# Patient Record
Sex: FEMALE | Race: WHITE | Employment: FULL TIME | URBAN - METROPOLITAN AREA
[De-identification: names, ages, dates, MRNs, and addresses within clinical notes are randomized per-mention and may not be internally consistent; named-entity substitution may affect disease eponyms.]

---

## 2017-01-05 ENCOUNTER — HOSPITAL ENCOUNTER (OUTPATIENT)
Dept: PHYSICAL THERAPY | Age: 37
Discharge: HOME | End: 2017-01-05
Payer: COMMERCIAL

## 2017-01-05 PROCEDURE — 97530 THERAPEUTIC ACTIVITIES: CPT | Performed by: PHYSICAL MEDICINE & REHABILITATION

## 2017-01-05 PROCEDURE — 97032 APPL MODALITY 1+ESTIM EA 15: CPT | Performed by: PHYSICAL MEDICINE & REHABILITATION

## 2017-01-05 NOTE — PROGRESS NOTES
PT DAILY TREATMENT NOTE - [facility] -15    Patient Name: Dilcia Torres  Date:2017  : 1980  [x]  Patient  Verified  Payor: BLUE CROSS / Plan: Noris Ivey [id number] PPO / Product Type: PPO /    In time: 9:00  Out time: 10:00  Total Treatment Time (min): 60  Visit #: 4    Treatment Area: Urinary, incontinence, stress female [N39.3]    SUBJECTIVE  Pain Level (0-10 scale):  1/10  Any medication changes, allergies to medications, adverse drug reactions, diagnosis change, or new procedure performed?: [x] No    [] Yes (see summary sheet for update)  Subjective functional status/changes:   [] No changes reported  Low back is much improved after last visit. Manual techniques and exercises were helpful. She did have a day of acute LBP following a 2 hour drive. She is doing her pelvic floor exercises regularly and feels she has increased strength and coordination, no functional bladder control improvements however.      OBJECTIVE    Modality rationale: increase muscle contraction/control to improve the patients ability to decrease urinary incontinence frequency and severity   Min Type Additional Details   30 [] Estim: []Att   []Unatt        []TENS instruct                  []IFC  []Premod   []NMES                     [x]Other:  Biofeedback []w/US   []w/ice   []w/heat  Position:  Supine and standing, multiple leg positions  Location: transvaginal    []  Traction: [] Cervical       []Lumbar                       [] Prone          []Supine                       []Intermittent   []Continuous Lbs:  [] before manual  [] after manual  []w/heat    []  Ultrasound: []Continuous   [] Pulsed at:                           []1MHz   []3MHz Location:  W/cm2:    [] Paraffin         Location:   []w/heat    []  Ice     []  Heat  []  Ice massage Position:  Location:    []  Laser  []  Other: Position:  Location:      []  Vasopneumatic Device Pressure:       [] lo [] med [] hi   Temperature:      [x] Skin assessment post-treatment:  [x]intact []redness- no adverse reaction    []redness  adverse reaction:     30 min Therapeutic Activity:  []  See flow sheet :   MET pub rami correction, L post innominate correction   Rationale: increase strength, improve coordination and increase proprioception  to improve the patients ability to decrease severity and frequency of urinary incontinence     With   [] TE   [x] TA   [] neuro   [] other: Patient Education: [x] Review HEP    [] Progressed/Changed HEP based on:   [] positioning   [] body mechanics   [] transfers   [] heat/ice application    [] other:      Other Objective/Functional Measures:  Improved endurance holds to 8 seconds, improved relaxation phase with quick drop off and no spikes. Will remeasure biofeedback next week. Pain Level (0-10 scale) post treatment: 0/10      ASSESSMENT/Changes in Function:     Patient will continue to benefit from skilled PT services to modify and progress therapeutic interventions, address functional mobility deficits, address ROM deficits, address strength deficits, analyze and address soft tissue restrictions, analyze and cue movement patterns, analyze and modify body mechanics/ergonomics and assess and modify postural abnormalities to attain remaining goals. [x]  See Plan of Care  []  See progress note/recertification  []  See Discharge Summary         Progress towards goals / Updated goals:  Able to advance exercises today with good tolerance. Pt continues to need instruction for correct exercise form and performance. Continues to demonstrate good potential to achieve functional goals stated on the plan of care.       PLAN  [x]  Upgrade activities as tolerated     []  Continue plan of care  []  Update interventions per flow sheet       []  Discharge due to:_  [x]  Other: Try squatting kegals with biofeedback next visit     Matt Isaacs, PT, MSPT   1/5/2017  9:11 AM

## 2017-01-11 ENCOUNTER — APPOINTMENT (OUTPATIENT)
Dept: PHYSICAL THERAPY | Age: 37
End: 2017-01-11
Payer: COMMERCIAL

## 2017-01-16 ENCOUNTER — HOSPITAL ENCOUNTER (OUTPATIENT)
Dept: PHYSICAL THERAPY | Age: 37
Discharge: HOME | End: 2017-01-16
Payer: COMMERCIAL

## 2017-01-16 PROCEDURE — 97032 APPL MODALITY 1+ESTIM EA 15: CPT | Performed by: PHYSICAL MEDICINE & REHABILITATION

## 2017-01-16 PROCEDURE — 97530 THERAPEUTIC ACTIVITIES: CPT | Performed by: PHYSICAL MEDICINE & REHABILITATION

## 2017-01-16 NOTE — PROGRESS NOTES
PT DAILY TREATMENT NOTE 2-15    Patient Name: Mehul Gutierrez  Date:2017  : 1980  [x]  Patient  Verified  Payor: BLUE CROSS / Plan: [facility] PPO / Product Type: PPO /    In time: 2:00  Out time: 3:00   Total Treatment Time (min): 60  Visit #: 5    Treatment Area: Urinary, incontinence, stress female [N39.3]    SUBJECTIVE  Pain Level (0-10 scale): 0/10  Any medication changes, allergies to medications, adverse drug reactions, diagnosis change, or new procedure performed?: [x] No    [] Yes (see summary sheet for update)  Subjective functional status/changes:   [] No changes reported  LBP fully resolved. She is doing her PFM exercises daily, multiple times a day.   Has been able to initiate PFM strengthening while standing, brushing teeth, etc.      OBJECTIVE    Modality rationale: decrease pain and increase tissue extensibility to improve the patients ability to decrease severity and frequency of urinary incontinence   Min Type Additional Details   30 [x] Estim: []Att   []Unatt        []TENS instruct                  []IFC  []Premod   []NMES                     [x]Other: Biofeedback []w/US   []w/ice   []w/heat  Position: Supine hook lying  Location: transvaginal    []  Traction: [] Cervical       []Lumbar                       [] Prone          []Supine                       []Intermittent   []Continuous Lbs:  [] before manual  [] after manual  []w/heat    []  Ultrasound: []Continuous   [] Pulsed at:                            []1MHz   []3MHz Location:  W/cm2:    []  Paraffin         Location:  []w/heat    []  Ice     []  Heat  []  Ice massage Position:  Location:    []  Laser  []  Other: Position:  Location:    []  Vasopneumatic Device Pressure:       [] lo [] med [] hi   Temperature:    [x] Skin assessment post-treatment:  [x]intact []redness- no adverse reaction    []redness  adverse reaction:     30 min Neuromuscular Re-education:  []  See flow sheet :   Rationale: increase strength, improve coordination and increase proprioception  to improve the patients ability to decrease severity and frequency of urinary incontinence. With   [] TE   [] TA   [x] neuro   [] other: Patient Education: [x] Review HEP    [] Progressed/Changed HEP based on:   [] positioning   [] body mechanics   [] transfers   [] heat/ice application    [] other:      Other Objective/Functional Measures:  Able to perform 10W/10R and 2W/2R in standing and while mini squatting. Pain Level (0-10 scale) post treatment: 0/10    ASSESSMENT/Changes in Function:     Patient will continue to benefit from skilled PT services to modify and progress therapeutic interventions, address functional mobility deficits, address ROM deficits, address strength deficits, analyze and address soft tissue restrictions, analyze and cue movement patterns and analyze and modify body mechanics/ergonomics to attain remaining goals. [x]  See Plan of Care  []  See progress note/recertification  []  See Discharge Summary         Progress towards goals / Updated goals:  Able to advance exercises today with good tolerance. Pt continues to need instruction for correct exercise form and performance. Continues to demonstrate good potential to achieve functional goals stated on the plan of care.       PLAN  [x]  Upgrade activities as tolerated     []  Continue plan of care  []  Update interventions per flow sheet       []  Discharge due to:_  []  Other:_      Desiree Lopes PT, MSPT 1/16/2017  2:07 PM

## 2017-01-17 ENCOUNTER — OFFICE VISIT (OUTPATIENT)
Dept: OBGYN CLINIC | Age: 37
End: 2017-01-17

## 2017-01-17 VITALS
BODY MASS INDEX: 26.66 KG/M2 | HEIGHT: 69 IN | DIASTOLIC BLOOD PRESSURE: 62 MMHG | SYSTOLIC BLOOD PRESSURE: 98 MMHG | WEIGHT: 180 LBS

## 2017-01-17 DIAGNOSIS — Z97.5 IUD (INTRAUTERINE DEVICE) IN PLACE: Primary | ICD-10-CM

## 2017-01-17 NOTE — MR AVS SNAPSHOT
Visit Information Date & Time Provider Department Dept. Phone Encounter #  
 1/17/2017  3:00 PM Sula Sandifer, MD [facility] [phone] Upcoming Health Maintenance Date Due INFLUENZA AGE 9 TO ADULT 8/1/2016 PAP AKA CERVICAL CYTOLOGY 10/31/2019 Allergies as of 1/17/2017  Review Complete On: 1/17/2017 By: Bill Corley LPN No Known Allergies Current Immunizations  Never Reviewed No immunizations on file. Not reviewed this visit Vitals BP Height(growth percentile) Weight(growth percentile) BMI OB Status Smoking Status 98/62 (BP 1 Location: Left arm, BP Patient Position: Sitting) 5' 9\" (1.753 m) 180 lb (81.6 kg) 26.58 kg/m2 IUD Never Smoker BMI and BSA Data Body Mass Index Body Surface Area  
 26.58 kg/m 2 1.99 m 2 Preferred Pharmacy Pharmacy Name Phone [id number] [place], [address] AT [facility] [phone] Your Updated Medication List  
  
   
This list is accurate as of: 1/17/17  3:36 PM.  Always use your most recent med list.  
  
  
  
  
 MIRENA 20 mcg/24 hr (5 years) IUD Generic drug:  levonorgestrel 1 Each by IntraUTERine route once. multivitamin with iron chewable tablet Commonly known as:  Garett Ramming Take 1 Tab by mouth daily. norgestimate-ethinyl estradiol 0.18/0.215/0.25 mg-25 mcg Tab Commonly known as:  ORTHO TRI-CYCLEN LO (28) Take 1 Tab by mouth daily. To-Do List   
 01/25/2017 8:30 AM  
  Appointment with Adali Schulz PT at [facility] PT [address] ([phone]) Patient Instructions Learning About Birth Control: Intrauterine Device (IUD) What is an intrauterine device (IUD)? The intrauterine device (IUD) is used to prevent pregnancy. It's a small, plastic, T-shaped device. Your doctor places the IUD in your uterus. You have a choice between a hormonal IUD and a copper IUD. The hormonal IUD prevents pregnancy by damaging or killing sperm. It also releases a type of the hormone progestin. Progestin prevents pregnancy in these ways: It thickens the mucus in the cervix. This makes it hard for sperm to travel into the uterus. It also thins the lining of the uterus, which makes it harder for a fertilized egg to attach to the uterus. Progestin can sometimes stop the ovaries from releasing an egg each month (ovulation). Hormonal IUDs prevent pregnancy for 3 to 5 years, depending on which IUD is used. Once you have it, you don't have to do anything else to prevent pregnancy. The copper IUD is wrapped in copper wire. Copper IUDs prevent pregnancy by making the uterus and fallopian tubes produce a fluid that kills sperm. The copper IUD prevents pregnancy for 10 years. Once you have it, you don't have to do anything else to prevent pregnancy. A string tied to the end of the IUD hangs down through the opening of the uterus (called the cervix) into the vagina. You can check that the IUD is in place by feeling for the string. The IUD usually stays in the uterus until your doctor removes it. How well does it work? In the first year of use: · When the hormonal IUD is used exactly as directed, fewer than 1 woman out of 100 has an unplanned pregnancy. · When the copper IUD is used exactly as directed, fewer than 1 woman out of 100 has an unplanned pregnancy. Be sure to tell your doctor about any health problems you have or medicines you take. He or she can help you choose the birth control method that is right for you. What are the advantages of an IUD? · An IUD is one of the most effective methods of birth control. · It prevents pregnancy for 3 to 10 years, depending on the type. You don't have to worry about birth control during this time. · It's safe to use while breastfeeding. · IUDs don't contain estrogen.  So you can use an IUD if you don't want to take estrogen or can't take estrogen because you have certain health problems or concerns. · An IUD is convenient. It is always providing birth control. You don't need to remember to take a pill or get a shot. You don't have to interrupt sex to protect against pregnancy. · A hormonal IUD may reduce heavy bleeding and cramping. What are the disadvantages of an IUD? · An IUD doesn't protect against sexually transmitted infections (STIs), such as herpes or HIV/AIDS. If you aren't sure if your sex partner might have an STI, use a condom to protect against disease. · A copper IUD may cause periods with more bleeding and cramping. · You have to see a doctor to have an IUD inserted and removed. · You have to check to see if the string is in place. Where can you learn more? Go to http://stella-saar.info/. Enter X977 in the search box to learn more about \"Learning About Birth Control: Intrauterine Device (IUD). \" Current as of: May 30, 2016 Content Version: 11.1 © 2980-6921 [employer]. Care instructions adapted under license by [employer] (which disclaims liability or warranty for this information). If you have questions about a medical condition or this instruction, always ask your healthcare professional. [address] any warranty or liability for your use of this information. Introducing [facility]! Evangelista James introduces foodjunky patient portal. Now you can access parts of your medical record, email your doctor's office, and request medication refills online. 1. In your internet browser, go to [email or web address] 2. Click on the First Time User? Click Here link in the Sign In box. You will see the New Member Sign Up page. 3. Enter your foodjunky Access Code exactly as it appears below. You will not need to use this code after youve completed the sign-up process.  If you do not sign up before the expiration date, you must request a new code. · Bonfaire Access Code: [email or web address] Expires: 1/29/2017 12:29 PM 
 
4. Enter the last four digits of your Social Security Number (xxxx) and Date of Birth (mm/dd/yyyy) as indicated and click Submit. You will be taken to the next sign-up page. 5. Create a Bonfaire ID. This will be your Bonfaire login ID and cannot be changed, so think of one that is secure and easy to remember. 6. Create a Bonfaire password. You can change your password at any time. 7. Enter your Password Reset Question and Answer. This can be used at a later time if you forget your password. 8. Enter your e-mail address. You will receive e-mail notification when new information is available in [address]. 9. Click Sign Up. You can now view and download portions of your medical record. 10. Click the Download Summary menu link to download a portable copy of your medical information. If you have questions, please visit the Frequently Asked Questions section of the Bon[employer] website. Remember, Bonfaire is NOT to be used for urgent needs. For medical emergencies, dial 911. Now available from your iPhone and Android! Please provide this summary of care documentation to your next provider. Your primary care clinician is listed as NONE. If you have any questions after today's visit, please call [phone].

## 2017-01-17 NOTE — PROGRESS NOTES
[facility]  [email or web address]    Adarsh Knight MD, [address] [facility]       OB/GYN Follow-up visit    Chief Complaint: Follow up visit  Chief Complaint   Patient presents with    Checkup IUD         History of Present Illness: This is a follow up visit from 12/05/16. She is having a follow up for IUD insertion. The patient reports having no bleeding or pain for 4 weeks. She reports the symptoms are has improved. Aggravating factors include none. Alleviating factors include none. She does not have other concerns. LMP: No LMP recorded. Patient is not currently having periods (Reason: IUD). PFSH:  Past Medical History   Diagnosis Date    Hx of abnormal cervical Pap smear     Pap smear for cervical cancer screening 10/31/2016     negative, HPV negative     No past surgical history on file. Family History   Problem Relation Age of Onset    High Cholesterol Father     Heart Attack Father     Stroke Maternal Grandfather     Heart Attack Paternal Grandfather     Stroke Paternal Grandfather     Pacemaker Paternal Grandfather     High Cholesterol Paternal Grandfather     Other Nephew      Autism    Mental Retardation Maternal Uncle     High Cholesterol Cousin      Paternal    High Cholesterol Paternal Uncle     Pacemaker Paternal Uncle     Other Paternal Uncle      CABG    Heart Attack Paternal Uncle      Social History   Substance Use Topics    Smoking status: Never Smoker    Smokeless tobacco: Never Used    Alcohol use None     No Known Allergies  Current Outpatient Prescriptions   Medication Sig    levonorgestrel (MIRENA) 20 mcg/24 hr (5 years) IUD 1 Each by IntraUTERine route once.  multivitamin with iron (FLINTSTONES) chewable tablet Take 1 Tab by mouth daily. No current facility-administered medications for this visit.         Review of Systems:  History obtained from the patient  Constitutional: negative for fevers, chills and weight loss  ENT ROS: negative for - hearing change, oral lesions or visual changes  Respiratory: negative for cough, wheezing or dyspnea on exertion  Cardiovascular: negative for chest pain, irregular heart beats, exertional chest pressure/discomfort  Gastrointestinal: negative for dysphagia, nausea and vomiting  Genito-Urinary ROS: see HPI  Inteument/breast: negative for rash, breast lump and nipple discharge  Musculoskeletal:negative for stiff joints, neck pain and muscle weakness  Endocrine ROS: negative for - breast changes, galactorrhea or temperature intolerance  Hematological and Lymphatic ROS: negative for - blood clots, bruising or swollen lymph nodes      Physical Exam:  Visit Vitals    BP 98/62 (BP 1 Location: Left arm, BP Patient Position: Sitting)    Ht 5' 9\" (1.753 m)    Wt 180 lb (81.6 kg)    BMI 26.58 kg/m2       GENERAL: alert, well appearing, and in no distress  PULM: clear to auscultation, no wheezes, rales or rhonchi, symmetric air entry   COR: normal rate and regular rhythm, S1 and S2 normal   ABDOMEN: soft, nontender, nondistended, no masses or organomegaly   EGBUS: no lesions, no inflammation, no masses  VULVA: normal appearing vulva with no masses, tenderness or lesions  VAGINA: normal appearing vagina with normal color, no lesions, no discharge  CERVIX: normal appearing cervix without discharge or lesions, non tender, tip of strings felt at cx os  UTERUS: uterus is normal size, shape, consistency and nontender   ADNEXA: normal adnexa in size, nontender and no masses  NEURO: alert, oriented, normal speech    Assessment:  Encounter Diagnosis   Name Primary?  IUD (intrauterine device) in place Yes   very short strings    Plan:  The patient is advised that she should contact the office with any questions or concerns. She should make her routine annual gynecologic appointment if needed. Add US to AE q year    No orders of the defined types were placed in this encounter.       No results found for this visit on 01/17/17. Phillip Espinoza MD    UTERUS IS ANTEVERTED, NORMAL IN SIZE AND ECHOGENICITY. ENDOMETRIUM MEASURES 6MM IN THICKNESS. NO EVIDENCE OF MASS OR ABNORMALITY SEEN  WITHIN THE ENDOMETRIAL CAVITY. IUD IS SEEN IN THE PROPER POSITION WITHIN THE ENDOMETRIAL CAVITY IN THE UTERINE FUNDUS. RIGHT OVARY APPEARS WITHIN NORMAL LIMITS. A FOLLICLE IS PRESENT. LEFT OVARY APPEARS WITHIN NORMAL LIMITS. NO FREE FLUID SEEN IN THE CDS.

## 2017-01-25 ENCOUNTER — APPOINTMENT (OUTPATIENT)
Dept: PHYSICAL THERAPY | Age: 37
End: 2017-01-25
Payer: COMMERCIAL

## 2017-05-24 NOTE — PROGRESS NOTES
[address]  Phone: [phone]  Fax: [phone]    Discharge Summary  2-15    Patient name: Lissa Fields  : 1980  Provider#: [id number]  Referral source: Sheri Rutherford MD      Medical/Treatment Diagnosis: Urinary, incontinence, stress female [N39.3]     Prior Hospitalization: see medical history     Comorbidities: See Plan of Care  Prior Level of Function:See Plan of Care  Medications: Verified on Patient Summary List    Start of Care: 15      Onset Date: 9 years  Visits from Start of Care: 5     Missed Visits: 0  Reporting Period :  16  to 17      ASSESSMENT/SUMMARY OF CARE: Mrs Dolores Teresa was referred to pelvic floor PT for evaluation and treatment of stress and urge urinary incontinence, urinary frequency. Treatment consisted of manual therapy, therapeutic exercise, therapeutic activity, bladder health education, bowel habit education, biofeedback assisted pelvic floor training, home exercise program development and progression. She made objective gains with PT efforts, improving her pelvic floor strength and endurance with limited reduction in incontinence episodes. She did have difficulty attending PT sessions regularly due to her work schedule, she ultimately discharged herself due to inability to attend sessions. She discharged premature prior to goal attainment, she would benefit from continued pelvic floor strengthening as her schedule allows. Short Term Goals:  To be accomplished in 6 weeks:  Patient will be independent with a progressive HEP   MET  Patient will report improved stool quality from PATIENTS [facility] 1-3 to [facility] 4 consistently   MET  Patient will be independent with proper stool elimination posture and technique in order to decrease strain on pelvic floor  MET  Patient will improve PFM strength from 3/5 to -4/5 in order to decrease severity and frequency of stress UI episodes. PROGRESSING     Long Term Goals: To be accomplished in 12 weeks:  Patient will demonstrate at least 4+/5 PFM in order to decrease severity and frequency of stress UI episodes. NOT MET  Patient will improve PFM holds from 3 seconds to 10 seconds in order to decrease severity and frequency of stress UI episodes  NOT MET  Patient will report no urinary incontinence for a full day at least 4 days a week. NOT MET  Patient will discharge to a progressive home exercise program in order to achieve full continence.  NOT MET      RECOMMENDATIONS:  [x]Discontinue therapy: []Patient has reached or is progressing toward set goals      [x]Patient is non-compliant or has abdicated      []Due to lack of appreciable progress towards set goals      []Other    Ivan Adame PT,MSPT    5/24/2017 12:20 PM

## 2017-11-10 ENCOUNTER — OFFICE VISIT (OUTPATIENT)
Dept: OBGYN CLINIC | Age: 37
End: 2017-11-10

## 2017-11-10 VITALS
BODY MASS INDEX: 25.77 KG/M2 | WEIGHT: 174 LBS | SYSTOLIC BLOOD PRESSURE: 110 MMHG | DIASTOLIC BLOOD PRESSURE: 62 MMHG | HEIGHT: 69 IN

## 2017-11-10 DIAGNOSIS — Z97.5 IUD (INTRAUTERINE DEVICE) IN PLACE: ICD-10-CM

## 2017-11-10 DIAGNOSIS — Z01.419 ENCOUNTER FOR GYNECOLOGICAL EXAMINATION (GENERAL) (ROUTINE) WITHOUT ABNORMAL FINDINGS: Primary | ICD-10-CM

## 2017-11-10 DIAGNOSIS — R32 URINARY INCONTINENCE, UNSPECIFIED TYPE: ICD-10-CM

## 2017-11-10 NOTE — PATIENT INSTRUCTIONS

## 2017-11-10 NOTE — MR AVS SNAPSHOT
Visit Information Date & Time Provider Department Dept. Phone Encounter #  
 11/10/2017  1:30 PM Tania Eddy MD [facility] [phone] [id number] Follow-up Instructions Return in about 1 year (around 11/10/2018), or if symptoms worsen or fail to improve, for Annual exam. Upcoming Health Maintenance Date Due Influenza Age 5 to Adult 8/1/2017 PAP AKA CERVICAL CYTOLOGY 10/31/2019 Allergies as of 11/10/2017  Review Complete On: 11/10/2017 By: Tania Eddy MD  
 No Known Allergies Current Immunizations  Never Reviewed No immunizations on file. Not reviewed this visit You Were Diagnosed With   
  
 Codes Comments Encounter for gynecological examination (general) (routine) without abnormal findings    -  Primary ICD-10-CM: S27.856 ICD-9-CM: V72.31   
 IUD (intrauterine device) in place     ICD-10-CM: Z97.5 ICD-9-CM: V45.51 Vitals BP Height(growth percentile) Weight(growth percentile) LMP BMI OB Status 110/62 5' 9\" (1.753 m) 174 lb (78.9 kg) 10/20/2017 25.7 kg/m2 IUD Smoking Status Never Smoker BMI and BSA Data Body Mass Index Body Surface Area 25.7 kg/m 2 1.96 m 2 Preferred Pharmacy Pharmacy Name Phone [id number] [phone] Your Updated Medication List  
  
   
This list is accurate as of: 11/10/17  1:36 PM.  Always use your most recent med list.  
  
  
  
  
 MIRENA 20 mcg/24 hr (5 years) Iud  
Generic drug:  levonorgestrel 1 Each by IntraUTERine route once. multivitamin with iron chewable tablet Commonly known as:  Andres Matias Take 1 Tab by mouth daily. Follow-up Instructions Return in about 1 year (around 11/10/2018), or if symptoms worsen or fail to improve, for Annual exam.  
  
  
Patient Instructions Well Visit, Ages 25 to 48: Care Instructions Your Care Instructions Physical exams can help you stay healthy. Your doctor has checked your overall health and may have suggested ways to take good care of yourself. He or she also may have recommended tests. At home, you can help prevent illness with healthy eating, regular exercise, and other steps. Follow-up care is a key part of your treatment and safety. Be sure to make and go to all appointments, and call your doctor if you are having problems. It's also a good idea to know your test results and keep a list of the medicines you take. How can you care for yourself at home? · Reach and stay at a healthy weight. This will lower your risk for many problems, such as obesity, diabetes, heart disease, and high blood pressure. · Get at least 30 minutes of physical activity on most days of the week. Walking is a good choice. You also may want to do other activities, such as running, swimming, cycling, or playing tennis or team sports. Discuss any changes in your exercise program with your doctor. · Do not smoke or allow others to smoke around you. If you need help quitting, talk to your doctor about stop-smoking programs and medicines. These can increase your chances of quitting for good. · Talk to your doctor about whether you have any risk factors for sexually transmitted infections (STIs). Having one sex partner (who does not have STIs and does not have sex with anyone else) is a good way to avoid these infections. · Use birth control if you do not want to have children at this time. Talk with your doctor about the choices available and what might be best for you. · Protect your skin from too much sun. When you're outdoors from 10 a.m. to 4 p.m., stay in the shade or cover up with clothing and a hat with a wide brim. Wear sunglasses that block UV rays. Even when it's cloudy, put broad-spectrum sunscreen (SPF 30 or higher) on any exposed skin. · See a dentist one or two times a year for checkups and to have your teeth cleaned. · Wear a seat belt in the car. · Drink alcohol in moderation, if at all. That means no more than 2 drinks a day for men and 1 drink a day for women. Follow your doctor's advice about when to have certain tests. These tests can spot problems early. For everyone · Cholesterol. Have the fat (cholesterol) in your blood tested after age 21. Your doctor will tell you how often to have this done based on your age, family history, or other things that can increase your risk for heart disease. · Blood pressure. Have your blood pressure checked during a routine doctor visit. Your doctor will tell you how often to check your blood pressure based on your age, your blood pressure results, and other factors. · Vision. Talk with your doctor about how often to have a glaucoma test. 
· Diabetes. Ask your doctor whether you should have tests for diabetes. · Colon cancer. Have a test for colon cancer at age 48. You may have one of several tests. If you are younger than 48, you may need a test earlier if you have any risk factors. Risk factors include whether you already had a precancerous polyp removed from your colon or whether your parent, brother, sister, or child has had colon cancer. For women · Breast exam and mammogram. Talk to your doctor about when you should have a clinical breast exam and a mammogram. Medical experts differ on whether and how often women under 50 should have these tests. Your doctor can help you decide what is right for you. · Pap test and pelvic exam. Begin Pap tests at age 24. A Pap test is the best way to find cervical cancer. The test often is part of a pelvic exam. Ask how often to have this test. 
· Tests for sexually transmitted infections (STIs). Ask whether you should have tests for STIs. You may be at risk if you have sex with more than one person, especially if your partners do not wear condoms. For men · Tests for sexually transmitted infections (STIs).  Ask whether you should have tests for STIs. You may be at risk if you have sex with more than one person, especially if you do not wear a condom. · Testicular cancer exam. Ask your doctor whether you should check your testicles regularly. · Prostate exam. Talk to your doctor about whether you should have a blood test (called a PSA test) for prostate cancer. Experts differ on whether and when men should have this test. Some experts suggest it if you are older than 39 and are -American or have a father or brother who got prostate cancer when he was younger than 72. When should you call for help? Watch closely for changes in your health, and be sure to contact your doctor if you have any problems or symptoms that concern you. Where can you learn more? Go to http://stella-sara.info/. Enter P072 in the search box to learn more about \"Well Visit, Ages 25 to 48: Care Instructions. \" Current as of: May 12, 2017 Content Version: 11.4 © 2411-2211 [employer]. Care instructions adapted under license by [employer] (which disclaims liability or warranty for this information). If you have questions about a medical condition or this instruction, always ask your healthcare professional. [place] [address] any warranty or liability for your use of this information. Introducing hospitals & HEALTH SERVICES! [facility] introduces HALFPOPS patient portal. Now you can access parts of your medical record, email your doctor's office, and request medication refills online. 1. In your internet browser, go to [email or web address] 2. Click on the First Time User? Click Here link in the Sign In box. You will see the New Member Sign Up page. 3. Enter your HALFPOPS Access Code exactly as it appears below. You will not need to use this code after youve completed the sign-up process. If you do not sign up before the expiration date, you must request a new code. · mSpot Access Code: [email or web address] Expires: 2/8/2018  1:32 PM 
 
4. Enter the last four digits of your Social Security Number (xxxx) and Date of Birth (mm/dd/yyyy) as indicated and click Submit. You will be taken to the next sign-up page. 5. Create a mSpot ID. This will be your mSpot login ID and cannot be changed, so think of one that is secure and easy to remember. 6. Create a mSpot password. You can change your password at any time. 7. Enter your Password Reset Question and Answer. This can be used at a later time if you forget your password. 8. Enter your e-mail address. You will receive e-mail notification when new information is available in [address] 9. Click Sign Up. You can now view and download portions of your medical record. 10. Click the Download Summary menu link to download a portable copy of your medical information. If you have questions, please visit the Frequently Asked Questions section of the mSpot website. Remember, mSpot is NOT to be used for urgent needs. For medical emergencies, dial 911. Now available from your iPhone and Android! Please provide this summary of care documentation to your next provider. Your primary care clinician is listed as NONE. If you have any questions after today's visit, please call [phone].

## 2017-11-10 NOTE — PROGRESS NOTES
[facility]  [email or web address]  [phone]    Enoch Becerra MD, FACOG       Annual Gynecologic Exam:  WWE <40  Chief Complaint   Patient presents with    Well Woman    Checkup IUD         Edy Ballard is a 44 y.o.  WHITE [place]  female who presents for an annual well woman exam.  Patient's last menstrual period was 10/20/2017. .    With regard to the Gardisil vaccine, she is older than the FDA approved age to receive it. She does not report additional concerns today. Still with UI: NI with PT, occurs with stress and occ spont leakage of small amount    Menstrual status:  Her periods are moderate. She does not report dysmenorrhea/painful menses. She does not report irregular bleeding. Sexual history and Contraception:  History   Sexual Activity    Sexual activity: Yes    Partners: Male    Birth control/ protection: IUD     She never use condoms with sexual activity  She does not reports new sexual partner(s) in the last year. The patient does not request STD testing. We recommended testing per CDC guidelines and at patient request.     Preventive Medicine History:  Her last annual GYN exam was about one year ago. Her most recent Pap smear result: normal was obtained in 2016. Her most recent HR HPV screen was Negative obtained 1 year(s) ago. She does not have a history of MICHAEL 2, 3 or cervical cancer.      Past Medical History:   Diagnosis Date    Encounter for insertion of mirena IUD 17    Hx of abnormal cervical Pap smear     Pap smear for cervical cancer screening 10/31/2016    negative, HPV negative     OB History    Para Term  AB Living   3 3 3 0 0 3   SAB TAB Ectopic Molar Multiple Live Births   0 0 0  0 3      # Outcome Date GA Lbr Rc/2nd Weight Sex Delivery Anes PTL Lv   3 Term 13 39w0d  8 lb 8 oz (3.856 kg) M Vag-Spont EPI N SYED   2 Term 09 40w0d  7 lb 15 oz (3.6 kg) F Vag-Spont EPI N SYED   1 Term 07/21/07 41w0d  8 lb 1 oz (3.657 kg) F Vag-Spont EPI N SYED        No past surgical history on file. Family History   Problem Relation Age of Onset    High Cholesterol Father     Heart Attack Father     Stroke Maternal Grandfather     Heart Attack Paternal Grandfather     Stroke Paternal Grandfather     Pacemaker Paternal Grandfather     High Cholesterol Paternal Grandfather     Other Nephew      Autism    Mental Retardation Maternal Uncle     High Cholesterol Cousin      Paternal    High Cholesterol Paternal Uncle     Pacemaker Paternal Uncle     Other Paternal Uncle      CABG    Heart Attack Paternal Uncle      Social History     Social History    Marital status:      Spouse name: N/A    Number of children: N/A    Years of education: N/A     Occupational History    Not on file. Social History Main Topics    Smoking status: Never Smoker    Smokeless tobacco: Never Used    Alcohol use Not on file    Drug use: Not on file    Sexual activity: Yes     Partners: Male     Birth control/ protection: IUD     Other Topics Concern    Not on file     Social History Narrative       No Known Allergies    Current Outpatient Prescriptions   Medication Sig    levonorgestrel (MIRENA) 20 mcg/24 hr (5 years) IUD 1 Each by IntraUTERine route once.  multivitamin with iron (FLINTSTONES) chewable tablet Take 1 Tab by mouth daily. No current facility-administered medications for this visit. There is no problem list on file for this patient.       Review of Systems - History obtained from the patient  Constitutional: negative for weight loss, fever, night sweats  HEENT: negative for hearing loss, earache, congestion, snoring, sorethroat  CV: negative for chest pain, palpitations, edema  Resp: negative for cough, shortness of breath, wheezing  GI: negative for change in bowel habits, abdominal pain, black or bloody stools  : negative for frequency, dysuria, hematuria  GYN: see HPI  MSK: negative for back pain, joint pain, muscle pain  Breast: negative for breast lumps, nipple discharge, galactorrhea  Skin :negative for itching, rash, hives  Neuro: negative for dizziness, headache, confusion, weakness  Psych: negative for anxiety, depression, change in mood  Heme/lymph: negative for bleeding, bruising, pallor    Physical Exam  Visit Vitals    /62    Ht 5' 9\" (1.753 m)    Wt 174 lb (78.9 kg)    LMP 10/20/2017    BMI 25.7 kg/m2       Constitutional  · Appearance: well-nourished, well developed, alert, in no acute distress    HENT  · Head and Face: appears normal    Neck  · Inspection/Palpation: normal appearance, no masses or tenderness  · Lymph Nodes: no lymphadenopathy present  · Thyroid: gland size normal, nontender, no nodules or masses present on palpation    Chest  · Respiratory Effort: breathing unlabored  · Auscultation: normal breath sounds    Cardiovascular  · Heart:  · Auscultation: regular rate and rhythm without murmur    Breasts  · Inspection of Breasts: breasts symmetrical, no skin changes, no discharge present, nipple appearance normal, no skin retraction present  · Palpation of Breasts and Axillae: no masses present on palpation, no breast tenderness  · Axillary Lymph Nodes: no lymphadenopathy present    Gastrointestinal  · Abdominal Examination: abdomen non-tender to palpation, normal bowel sounds, no masses present  · Liver and spleen: no hepatomegaly present, spleen not palpable  · Hernias: no hernias identified    Genitourinary  · External Genitalia: normal appearance for age, no discharge present, no tenderness present, no inflammatory lesions present, no masses present  · Vagina: normal vaginal vault with anterior laxity. no discharge present, no inflammatory lesions present, no masses present  · Bladder: non-tender to palpation  · Urethra: appears normal  · Cervix: normal  · ?  Strings at os   · Uterus: normal size, shape and consistency  · Adnexa: no adnexal tenderness present, no adnexal masses present  · Perineum: perineum within normal limits, no evidence of trauma, no rashes or skin lesions present  · Anus: anus within normal limits, no hemorrhoids present  · Inguinal Lymph Nodes: no lymphadenopathy present    Skin  · General Inspection: no rash, no lesions identified    Neurologic/Psychiatric  · Mental Status:  · Orientation: grossly oriented to person, place and time  · Mood and Affect: mood normal, affect appropriate    Assessment:  44 y.o.  for well woman exam  Encounter Diagnoses   Name Primary?  Encounter for gynecological examination (general) (routine) without abnormal findings Yes    IUD (intrauterine device) in place     Urinary incontinence, unspecified type        Plan:  The patient was counseled about diet, exercise, healthy lifestyle  We discussed self breast exam  We discussed safer sex practices, condom use and risk factors for sexually transmitted diseases. We discussed current pap smear and HR HPV testing guidelines. We recommend follow up one year for routine annual gynecologic exam or sooner prn  We recommend routine follow up with her primary care doctor for management of chronic medical problems and non-gynecologic concerns  Handouts were given to the patient  We discussed calcium/vitamin D/weight bearing exercise and osteoporosis prevention  AE/US: check IUD, difficult to see strings  Disc options for UI: NI with PT  Bladder diet ho  Disc option of UDT/surgery/medical management/ pessary      Folllow up:  [x] return for annual well woman exam in one year or sooner if she is having problems  [] follow up and ultrasound  [] 6 months  [] 3 months  [] 6 weeks   [] 1 month    No orders of the defined types were placed in this encounter. Results for orders placed or performed in visit on 11/10/17   US TRANSVAGINAL    Narrative    See impression. Impression    Impression:       The final result for this exam can be located in this patient's chart with  results from [facility]. Physician review of ultrasound performed by technician    Today's ultrasound report and images were reviewed and discussed with the patient.   Please see images and imaging report entered by technician in PACS for more detail and progress note and diagnosis entered by MD.    Blayne Levy MD

## 2018-11-12 ENCOUNTER — OFFICE VISIT (OUTPATIENT)
Dept: OBGYN CLINIC | Age: 38
End: 2018-11-12

## 2018-11-12 VITALS
SYSTOLIC BLOOD PRESSURE: 100 MMHG | HEIGHT: 69 IN | DIASTOLIC BLOOD PRESSURE: 58 MMHG | WEIGHT: 183.13 LBS | BODY MASS INDEX: 27.12 KG/M2

## 2018-11-12 DIAGNOSIS — T83.32XD INTRAUTERINE CONTRACEPTIVE DEVICE THREADS LOST, SUBSEQUENT ENCOUNTER: ICD-10-CM

## 2018-11-12 DIAGNOSIS — Z01.419 ENCOUNTER FOR GYNECOLOGICAL EXAMINATION (GENERAL) (ROUTINE) WITHOUT ABNORMAL FINDINGS: Primary | ICD-10-CM

## 2018-11-12 NOTE — PATIENT INSTRUCTIONS

## 2018-11-12 NOTE — PROGRESS NOTES
[facility] 
[email or web address] 
[phone] Trudi Gavin MD, Guy Monteiro Annual Gynecologic Exam: 
WWE <40 Chief Complaint Patient presents with  Well Woman  Follow-up IUD check + US Maki Shabazz is a 43 y.o.  WHITE [place]  female who presents for an annual well woman exam. 
Patient's last menstrual period was 10/28/2018. Zuri Mcintosh With regard to the Gardisil vaccine, she is older than the FDA approved age to receive it. She does not report additional concerns today. She is also here for IUD f/u and US. Mirena was placed 17. US today reveals: UTERUS IS ANTEVERTED, NORMAL IN SIZE AND ECHOGENICITY. ENDOMETRIUM MEASURES 4-5MM IN THICKNESS. NO EVIDENCE OF MASS OR ABNORMALITY SEEN 
WITHIN THE ENDOMETRIAL CAVITY. IUD IS SEEN IN THE PROPER POSITION WITHIN THE ENDOMETRIAL CAVITY IN THE UTERINE FUNDUS. RIGHT OVARY APPEARS WITHIN NORMAL LIMITS. A FOLLICULAR CYST IS SEEN. LEFT OVARY APPEARS WITHIN NORMAL LIMITS. NO FREE FLUID SEEN IN THE CDS. Menstrual status: 
Her periods are light. She does not report dysmenorrhea/painful menses. She does not report irregular bleeding. Sexual history and Contraception: 
Social History Substance and Sexual Activity Sexual Activity Yes  Partners: Male  Birth control/protection: IUD She never use condoms with sexual activity She does not reports new sexual partner(s) in the last year. The patient does not request STD testing. We recommended testing per CDC guidelines and at patient request.  
 
Preventive Medicine History: 
Her most recent Pap smear result: normal was obtained in 2016 Her most recent HR HPV screen was Negative obtained in  She does not have a history of MICHAEL 2, 3 or cervical cancer. Past Medical History:  
Diagnosis Date  Encounter for insertion of mirena IUD 17  Hx of abnormal cervical Pap smear  Pap smear for cervical cancer screening 10/31/2016  
 negative, HPV negative OB History  Para Term  AB Living 3 3 3 0 0 3 SAB TAB Ectopic Molar Multiple Live Births  
0 0 0   0 3 # Outcome Date GA Lbr Rc/2nd Weight Sex Delivery Anes PTL Lv  
3 Term 13 39w0d  8 lb 8 oz (3.856 kg) M Vag-Spont EPI N SYED  
2 Term 09 40w0d  7 lb 15 oz (3.6 kg) F Vag-Spont EPI N SYED  
1 Term 07 41w0d  8 lb 1 oz (3.657 kg) F Vag-Spont EPI N SYED History reviewed. No pertinent surgical history. Family History Problem Relation Age of Onset  High Cholesterol Father  Heart Attack Father  Stroke Maternal Grandfather  Heart Attack Paternal Grandfather  Stroke Paternal Grandfather  Pacemaker Paternal Grandfather  High Cholesterol Paternal Grandfather  Other Nephew Autism  Mental Retardation Maternal Uncle  High Cholesterol Cousin Paternal  
 High Cholesterol Paternal Uncle  Pacemaker Paternal Uncle  Other Paternal Uncle CABG  
 Heart Attack Paternal Uncle Social History Socioeconomic History  Marital status:  Spouse name: Not on file  Number of children: Not on file  Years of education: Not on file  Highest education level: Not on file Social Needs  Financial resource strain: Not on file  Food insecurity - worry: Not on file  Food insecurity - inability: Not on file  Transportation needs - medical: Not on file  Transportation needs - non-medical: Not on file Occupational History  Not on file Tobacco Use  Smoking status: Never Smoker  Smokeless tobacco: Never Used Substance and Sexual Activity  Alcohol use: Not on file  Drug use: Not on file  Sexual activity: Yes  
  Partners: Male Birth control/protection: IUD Other Topics Concern  Not on file Social History Narrative  Not on file No Known Allergies Current Outpatient Medications Medication Sig  levonorgestrel (MIRENA) 20 mcg/24 hr (5 years) IUD 1 Each by IntraUTERine route once.  multivitamin with iron (FLINTSTONES) chewable tablet Take 1 Tab by mouth daily. No current facility-administered medications for this visit. There is no problem list on file for this patient. Review of Systems - History obtained from the patient Constitutional: negative for weight loss, fever, night sweats HEENT: negative for hearing loss, earache, congestion, snoring, sorethroat CV: negative for chest pain, palpitations, edema Resp: negative for cough, shortness of breath, wheezing GI: negative for change in bowel habits, abdominal pain, black or bloody stools : negative for frequency, dysuria, hematuria GYN: see HPI 
MSK: negative for back pain, joint pain, muscle pain Breast: negative for breast lumps, nipple discharge, galactorrhea Skin :negative for itching, rash, hives Neuro: negative for dizziness, headache, confusion, weakness Psych: negative for anxiety, depression, change in mood Heme/lymph: negative for bleeding, bruising, pallor Physical Exam 
Visit Vitals /58 Ht 5' 9\" (1.753 m) Wt 183 lb 2 oz (83.1 kg) LMP 10/28/2018 BMI 27.04 kg/m² Constitutional 
· Appearance: well-nourished, well developed, alert, in no acute distress HENT 
· Head and Face: appears normal 
 
Neck · Inspection/Palpation: normal appearance, no masses or tenderness · Lymph Nodes: no lymphadenopathy present · Thyroid: gland size normal, nontender, no nodules or masses present on palpation Chest 
· Respiratory Effort: breathing unlabored · Auscultation: normal breath sounds Cardiovascular · Heart: 
· Auscultation: regular rate and rhythm without murmur Breasts · Inspection of Breasts: breasts symmetrical, no skin changes, no discharge present, nipple appearance normal, no skin retraction present · Palpation of Breasts and Axillae: no masses present on palpation, no breast tenderness · Axillary Lymph Nodes: no lymphadenopathy present Gastrointestinal 
· Abdominal Examination: abdomen non-tender to palpation, normal bowel sounds, no masses present · Liver and spleen: no hepatomegaly present, spleen not palpable · Hernias: no hernias identified Genitourinary · External Genitalia: normal appearance for age, no discharge present, no tenderness present, no inflammatory lesions present, no masses present · Vagina: normal vaginal vault without central or paravaginal defects, no discharge present, no inflammatory lesions present, no masses present · Bladder: non-tender to palpation · Urethra: appears normal 
· Cervix: normal  
· IUD strings not seen · Uterus: normal size, shape and consistency · Adnexa: no adnexal tenderness present, no adnexal masses present · Perineum: perineum within normal limits, no evidence of trauma, no rashes or skin lesions present · Anus: anus within normal limits, no hemorrhoids present · Inguinal Lymph Nodes: no lymphadenopathy present Skin · General Inspection: no rash, no lesions identified Neurologic/Psychiatric · Mental Status: · Orientation: grossly oriented to person, place and time · Mood and Affect: mood normal, affect appropriate Assessment: 
43 y.o.  for well woman exam 
Encounter Diagnoses Name Primary?  Encounter for gynecological examination (general) (routine) without abnormal findings Yes  Intrauterine contraceptive device threads lost, subsequent encounter Plan: The patient was counseled about diet, exercise, healthy lifestyle We discussed self breast exam 
We discussed safer sex practices, condom use and risk factors for sexually transmitted diseases. We discussed current pap smear and HR HPV testing guidelines.   
We recommend follow up one year for routine annual gynecologic exam or sooner prn 
 We recommend routine follow up with her primary care doctor for management of chronic medical problems and non-gynecologic concerns Handouts were given to the patient We discussed calcium/vitamin D/weight bearing exercise and osteoporosis prevention FU US with ae since IUD strings not seen Folllow up: 
[x] return for annual well woman exam in one year or sooner if she is having problems 
[] follow up and ultrasound [] 6 months 
[] 3 months 
[] 6 weeks [] 1 month No orders of the defined types were placed in this encounter. No results found for any visits on 11/12/18.

## 2019-11-15 NOTE — PATIENT INSTRUCTIONS
Well Visit, Ages 25 to 48: Care Instructions  Your Care Instructions    Physical exams can help you stay healthy. Your doctor has checked your overall health and may have suggested ways to take good care of yourself. He or she also may have recommended tests. At home, you can help prevent illness with healthy eating, regular exercise, and other steps. Follow-up care is a key part of your treatment and safety. Be sure to make and go to all appointments, and call your doctor if you are having problems. It's also a good idea to know your test results and keep a list of the medicines you take. How can you care for yourself at home? · Reach and stay at a healthy weight. This will lower your risk for many problems, such as obesity, diabetes, heart disease, and high blood pressure. · Get at least 30 minutes of physical activity on most days of the week. Walking is a good choice. You also may want to do other activities, such as running, swimming, cycling, or playing tennis or team sports. Discuss any changes in your exercise program with your doctor. · Do not smoke or allow others to smoke around you. If you need help quitting, talk to your doctor about stop-smoking programs and medicines. These can increase your chances of quitting for good. · Talk to your doctor about whether you have any risk factors for sexually transmitted infections (STIs). Having one sex partner (who does not have STIs and does not have sex with anyone else) is a good way to avoid these infections. · Use birth control if you do not want to have children at this time. Talk with your doctor about the choices available and what might be best for you. · Protect your skin from too much sun. When you're outdoors from 10 a.m. to 4 p.m., stay in the shade or cover up with clothing and a hat with a wide brim. Wear sunglasses that block UV rays. Even when it's cloudy, put broad-spectrum sunscreen (SPF 30 or higher) on any exposed skin.   · See a dentist one or two times a year for checkups and to have your teeth cleaned. · Wear a seat belt in the car. Follow your doctor's advice about when to have certain tests. These tests can spot problems early. For everyone  · Cholesterol. Have the fat (cholesterol) in your blood tested after age 21. Your doctor will tell you how often to have this done based on your age, family history, or other things that can increase your risk for heart disease. · Blood pressure. Have your blood pressure checked during a routine doctor visit. Your doctor will tell you how often to check your blood pressure based on your age, your blood pressure results, and other factors. · Vision. Talk with your doctor about how often to have a glaucoma test.  · Diabetes. Ask your doctor whether you should have tests for diabetes. · Colon cancer. Your risk for colorectal cancer gets higher as you get older. Some experts say that adults should start regular screening at age 48 and stop at age 76. Others say to start before age 48 or continue after age 76. Talk with your doctor about your risk and when to start and stop screening. For women  · Breast exam and mammogram. Talk to your doctor about when you should have a clinical breast exam and a mammogram. Medical experts differ on whether and how often women under 50 should have these tests. Your doctor can help you decide what is right for you. · Cervical cancer screening test and pelvic exam. Begin with a Pap test at age 24. The test often is part of a pelvic exam. Starting at age 27, you may choose to have a Pap test, an HPV test, or both tests at the same time (called co-testing). Talk with your doctor about how often to have testing. · Tests for sexually transmitted infections (STIs). Ask whether you should have tests for STIs. You may be at risk if you have sex with more than one person, especially if your partners do not wear condoms.   For men  · Tests for sexually transmitted infections (STIs). Ask whether you should have tests for STIs. You may be at risk if you have sex with more than one person, especially if you do not wear a condom. · Testicular cancer exam. Ask your doctor whether you should check your testicles regularly. · Prostate exam. Talk to your doctor about whether you should have a blood test (called a PSA test) for prostate cancer. Experts differ on whether and when men should have this test. Some experts suggest it if you are older than 39 and are -American or have a father or brother who got prostate cancer when he was younger than 72. When should you call for help? Watch closely for changes in your health, and be sure to contact your doctor if you have any problems or symptoms that concern you. Where can you learn more? Go to http://stella-sara.info/. Enter P072 in the search box to learn more about \"Well Visit, Ages 25 to 48: Care Instructions. \"  Current as of: December 13, 2018  Content Version: 12.2  © 5435-0107 [employer]. Care instructions adapted under license by [employer] (which disclaims liability or warranty for this information). If you have questions about a medical condition or this instruction, always ask your healthcare professional. [place] [address] any warranty or liability for your use of this information.

## 2019-11-18 ENCOUNTER — OFFICE VISIT (OUTPATIENT)
Dept: OBGYN CLINIC | Age: 39
End: 2019-11-18

## 2019-11-18 VITALS — SYSTOLIC BLOOD PRESSURE: 98 MMHG | BODY MASS INDEX: 26.43 KG/M2 | DIASTOLIC BLOOD PRESSURE: 56 MMHG | WEIGHT: 179 LBS

## 2019-11-18 DIAGNOSIS — Z01.419 ENCOUNTER FOR WELL WOMAN EXAM WITH ROUTINE GYNECOLOGICAL EXAM: Primary | ICD-10-CM

## 2019-11-18 DIAGNOSIS — Z01.419 ENCOUNTER FOR GYNECOLOGICAL EXAMINATION (GENERAL) (ROUTINE) WITHOUT ABNORMAL FINDINGS: ICD-10-CM

## 2019-11-18 DIAGNOSIS — T83.32XD INTRAUTERINE CONTRACEPTIVE DEVICE THREADS LOST, SUBSEQUENT ENCOUNTER: ICD-10-CM

## 2019-11-18 NOTE — PROGRESS NOTES
[address] [facility]  [email or web address]  [phone]    Jorge Alberto Ortiz MD, [facility]       Annual Gynecologic Exam  WWE 40-60  Chief Complaint   Patient presents with    Well Woman    Checkup IUD       Diony Velazquez is a 35 y.o.  WHITE [place]   female who presents for an annual exam.  Patient's last menstrual period was 2019. Negrito Kincaid She does not report additional concerns today. Menstrual status:  Her periods are light. Patient has IUD. She does not report dysmenorrhea/painful menses. She does not report irregular bleeding. Sexual history and Contraception:  Social History     Substance and Sexual Activity   Sexual Activity Yes    Partners: Male    Birth control/protection: IUD       She does not reports new sexual partner(s) in the last year. The patient does not request STD testing. Preventive Medicine History:  Her most recent Pap smear result: normal was obtained in 2016    Her most recent HR HPV screen was Negative obtained in     She does not have a history of MICHAEL 2, 3 or cervical cancer. Breast health:  Last mammogram: patient has never had a mammogram.   A mammogram was not scheduled for today. Breast cancer family updated: see . Bone health: Negrito Kincaid She does not have a history of osteopenia/osteoporosis. Osteoporosis family history updated: see .      Past Medical History:   Diagnosis Date    Encounter for insertion of mirena IUD 17    Hx of abnormal cervical Pap smear     Pap smear for cervical cancer screening 10/31/2016    negative, HPV negative     OB History    Para Term  AB Living   3 3 3 0 0 3   SAB TAB Ectopic Molar Multiple Live Births   0 0 0   0 3      # Outcome Date GA Lbr Rc/2nd Weight Sex Delivery Anes PTL Lv   3 Term 13 39w0d  8 lb 8 oz (3.856 kg) M Vag-Spont EPI N SYED   2 Term 09 40w0d  7 lb 15 oz (3.6 kg) F Vag-Spont EPI N SYED   1 Term 07 41w0d  8 lb 1 oz (3.657 kg) F Vag-Spont EPI N SYED       History reviewed. No pertinent surgical history.   Family History   Problem Relation Age of Onset    High Cholesterol Father     Heart Attack Father     Stroke Maternal Grandfather     Heart Attack Paternal Grandfather     Stroke Paternal Grandfather     Pacemaker Paternal Grandfather     High Cholesterol Paternal Grandfather     Other Nephew         Autism    Mental Retardation Maternal Uncle     High Cholesterol Cousin         Paternal    High Cholesterol Paternal Uncle     Pacemaker Paternal Uncle     Other Paternal Uncle         CABG    Heart Attack Paternal Uncle      Social History     Socioeconomic History    Marital status:      Spouse name: Not on file    Number of children: Not on file    Years of education: Not on file    Highest education level: Not on file   Occupational History    Not on file   Social Needs    Financial resource strain: Not on file    Food insecurity:     Worry: Not on file     Inability: Not on file    Transportation needs:     Medical: Not on file     Non-medical: Not on file   Tobacco Use    Smoking status: Never Smoker    Smokeless tobacco: Never Used   Substance and Sexual Activity    Alcohol use: Not on file    Drug use: Not on file    Sexual activity: Yes     Partners: Male     Birth control/protection: IUD   Lifestyle    Physical activity:     Days per week: Not on file     Minutes per session: Not on file    Stress: Not on file   Relationships    Social connections:     Talks on phone: Not on file     Gets together: Not on file     Attends Hinduism service: Not on file     Active member of club or organization: Not on file     Attends meetings of clubs or organizations: Not on file     Relationship status: Not on file    Intimate partner violence:     Fear of current or ex partner: Not on file     Emotionally abused: Not on file     Physically abused: Not on file     Forced sexual activity: Not on file   Other Topics Concern    Not on file   Social History Narrative    Not on file       No Known Allergies    Current Outpatient Medications   Medication Sig    levonorgestrel (MIRENA) 20 mcg/24 hr (5 years) IUD 1 Each by IntraUTERine route once.  multivitamin with iron (FLINTSTONES) chewable tablet Take 1 Tab by mouth daily. No current facility-administered medications for this visit. There is no problem list on file for this patient.       Review of Systems - History obtained from the patient  Constitutional: negative for weight loss, fever, night sweats  HEENT: negative for hearing loss, earache, congestion, snoring, sorethroat  CV: negative for chest pain, palpitations, edema  Resp: negative for cough, shortness of breath, wheezing  GI: negative for change in bowel habits, abdominal pain, black or bloody stools  : negative for frequency, dysuria, hematuria, vaginal discharge  MSK: negative for back pain, joint pain, muscle pain  Breast: negative for breast lumps, nipple discharge, galactorrhea  Skin :negative for itching, rash, hives  Neuro: negative for dizziness, headache, confusion, weakness  Psych: negative for anxiety, depression, change in mood  Heme/lymph: negative for bleeding, bruising, pallor      (WWE continued)     Physical Exam  Visit Vitals  BP 98/56   Wt 179 lb (81.2 kg)   LMP 11/02/2019   BMI 26.43 kg/m²       Constitutional  · Appearance: well-nourished, well developed, alert, in no acute distress    HENT  · Head and Face: appears normal    Neck  · Inspection/Palpation: normal appearance, no masses or tenderness  · Lymph Nodes: no lymphadenopathy present  · Thyroid: gland size normal, nontender, no nodules or masses present on palpation    Chest  · Respiratory Effort: breathing unlabored  · Auscultation: normal breath sounds    Cardiovascular  · Heart:  · Auscultation: regular rate and rhythm without murmur    Breasts  · Inspection of Breasts: breasts symmetrical, no skin changes, no discharge present, nipple appearance normal, no skin retraction present  · Palpation of Breasts and Axillae: no masses present on palpation, no breast tenderness  · Axillary Lymph Nodes: no lymphadenopathy present    Gastrointestinal  · Abdominal Examination: abdomen non-tender to palpation, normal bowel sounds, no masses present  · Liver and spleen: no hepatomegaly present, spleen not palpable  · Hernias: no hernias identified    Genitourinary  · External Genitalia: normal appearance for age, no discharge present, no tenderness present, no inflammatory lesions present, no masses present, without atrophy present  · Vagina: normal vaginal vault without central or paravaginal defects, no discharge present, no inflammatory lesions present, no masses present  · Bladder: non-tender to palpation  · Urethra: appears normal  · Cervix: normal   · iud strings not seen  · Uterus: normal size, shape and consistency  · Adnexa: no adnexal tenderness present, no adnexal masses present  · Perineum: perineum within normal limits, no evidence of trauma, no rashes or skin lesions present  · Anus: anus within normal limits, no hemorrhoids present  · Inguinal Lymph Nodes: no lymphadenopathy present    Skin  · General Inspection: no rash, no lesions identified    Neurologic/Psychiatric  · Mental Status:  · Orientation: grossly oriented to person, place and time  · Mood and Affect: mood normal, affect appropriate    Assessment:  35 y.o.  for well woman exam  Encounter Diagnoses   Name Primary?     Encounter for well woman exam with routine gynecological exam Yes    Encounter for gynecological examination (general) (routine) without abnormal findings     Intrauterine contraceptive device threads lost, subsequent encounter        Plan:  The patient was counseled about diet, exercise, healthy lifestyle  We discussed current self breast exam and mammogram recommendations  We discussed current pap smear and HR HPV testing guidelines  We recommend follow up in one year for routine annual gynecologic exam or sooner if needed  We recommend follow up with a primary care physician for any chronic medical problems or non-gynecologic concerns    We discussed calcium/vitamin D/weight bearing exercise and osteoporosis prevention  Handouts were given to the patient       Folllow up:  [x] return for annual well woman exam in one year or sooner if she is having problems  [] follow up and ultrasound  [x] mammogram  [] 6 months  [] 3 months  [] 1 month    Orders Placed This Encounter    PAP IG, HPV AND RFX HPV 77/85,73([id number])       Results for orders placed or performed in visit on 11/18/19   US TRANSVAGINAL    Narrative    See impression. Impression    Impression: The final result for this exam can be located in this patient's chart with  results from [facility]. Physician review of ultrasound performed by technician    Today's ultrasound report and images were reviewed and discussed with the patient. Please see images and imaging report entered by technician in PACS for more detail and progress note and diagnosis entered by MD.    Frederic Booker MD      UTERUS IS ANTEVERTED, NORMAL IN SIZE AND ECHOGENICITY. ENDOMETRIUM MEASURES 3-4MM IN THICKNESS. NO EVIDENCE OF MASS OR ABNORMALITY SEEN  WITHIN THE ENDOMETRIAL CAVITY. IUD IS SEEN IN THE PROPER POSITION WITHIN THE ENDOMETRIAL CAVITY IN THE UTERINE FUNDUS. RIGHT OVARY APPEARS WITHIN NORMAL LIMITS. LEFT OVARY APPEARS WITHIN NORMAL LIMITS. NO FREE FLUID SEEN IN THE CDS.

## 2019-11-21 LAB
CYTOLOGIST CVX/VAG CYTO: NORMAL
CYTOLOGY CVX/VAG DOC CYTO: NORMAL
CYTOLOGY CVX/VAG DOC THIN PREP: NORMAL
CYTOLOGY HISTORY:: NORMAL
DX ICD CODE: NORMAL
HPV I/H RISK 1 DNA CVX QL PROBE+SIG AMP: NEGATIVE
Lab: NORMAL
OTHER STN SPEC: NORMAL
STAT OF ADQ CVX/VAG CYTO-IMP: NORMAL

## 2020-11-19 NOTE — PATIENT INSTRUCTIONS
Well Visit, Ages 25 to 48: Care Instructions Your Care Instructions Physical exams can help you stay healthy. Your doctor has checked your overall health and may have suggested ways to take good care of yourself. He or she also may have recommended tests. At home, you can help prevent illness with healthy eating, regular exercise, and other steps. Follow-up care is a key part of your treatment and safety. Be sure to make and go to all appointments, and call your doctor if you are having problems. It's also a good idea to know your test results and keep a list of the medicines you take. How can you care for yourself at home? · Reach and stay at a healthy weight. This will lower your risk for many problems, such as obesity, diabetes, heart disease, and high blood pressure. · Get at least 30 minutes of physical activity on most days of the week. Walking is a good choice. You also may want to do other activities, such as running, swimming, cycling, or playing tennis or team sports. Discuss any changes in your exercise program with your doctor. · Do not smoke or allow others to smoke around you. If you need help quitting, talk to your doctor about stop-smoking programs and medicines. These can increase your chances of quitting for good. · Talk to your doctor about whether you have any risk factors for sexually transmitted infections (STIs). Having one sex partner (who does not have STIs and does not have sex with anyone else) is a good way to avoid these infections. · Use birth control if you do not want to have children at this time. Talk with your doctor about the choices available and what might be best for you. · Protect your skin from too much sun. When you're outdoors from 10 a.m. to 4 p.m., stay in the shade or cover up with clothing and a hat with a wide brim. Wear sunglasses that block UV rays. Even when it's cloudy, put broad-spectrum sunscreen (SPF 30 or higher) on any exposed skin. · See a dentist one or two times a year for checkups and to have your teeth cleaned. · Wear a seat belt in the car. Follow your doctor's advice about when to have certain tests. These tests can spot problems early. For everyone · Cholesterol. Have the fat (cholesterol) in your blood tested after age 21. Your doctor will tell you how often to have this done based on your age, family history, or other things that can increase your risk for heart disease. · Blood pressure. Have your blood pressure checked during a routine doctor visit. Your doctor will tell you how often to check your blood pressure based on your age, your blood pressure results, and other factors. · Vision. Talk with your doctor about how often to have a glaucoma test. 
· Diabetes. Ask your doctor whether you should have tests for diabetes. · Colon cancer. Your risk for colorectal cancer gets higher as you get older. Some experts say that adults should start regular screening at age 48 and stop at age 76. Others say to start before age 48 or continue after age 76. Talk with your doctor about your risk and when to start and stop screening. For women · Breast exam and mammogram. Talk to your doctor about when you should have a clinical breast exam and a mammogram. Medical experts differ on whether and how often women under 50 should have these tests. Your doctor can help you decide what is right for you. · Cervical cancer screening test and pelvic exam. Begin with a Pap test at age 24. The test often is part of a pelvic exam. Starting at age 27, you may choose to have a Pap test, an HPV test, or both tests at the same time (called co-testing). Talk with your doctor about how often to have testing. · Tests for sexually transmitted infections (STIs). Ask whether you should have tests for STIs. You may be at risk if you have sex with more than one person, especially if your partners do not wear condoms. For men · Tests for sexually transmitted infections (STIs). Ask whether you should have tests for STIs. You may be at risk if you have sex with more than one person, especially if you do not wear a condom. · Testicular cancer exam. Ask your doctor whether you should check your testicles regularly. · Prostate exam. Talk to your doctor about whether you should have a blood test (called a PSA test) for prostate cancer. Experts differ on whether and when men should have this test. Some experts suggest it if you are older than 39 and are -American or have a father or brother who got prostate cancer when he was younger than 72. When should you call for help? Watch closely for changes in your health, and be sure to contact your doctor if you have any problems or symptoms that concern you. Where can you learn more? Go to http://www.odom.com/ Enter P072 in the search box to learn more about \"Well Visit, Ages 25 to 48: Care Instructions. \" Current as of: May 27, 2020               Content Version: 12.6 © 2006-2020 [employer]. Care instructions adapted under license by [employer] (which disclaims liability or warranty for this information). If you have questions about a medical condition or this instruction, always ask your healthcare professional. [address] any warranty or liability for your use of this information.

## 2020-11-20 ENCOUNTER — OFFICE VISIT (OUTPATIENT)
Dept: OBGYN CLINIC | Age: 40
End: 2020-11-20
Payer: COMMERCIAL

## 2020-11-20 VITALS
DIASTOLIC BLOOD PRESSURE: 71 MMHG | SYSTOLIC BLOOD PRESSURE: 100 MMHG | HEIGHT: 69 IN | WEIGHT: 181 LBS | HEART RATE: 65 BPM | BODY MASS INDEX: 26.81 KG/M2

## 2020-11-20 DIAGNOSIS — T83.32XD INTRAUTERINE CONTRACEPTIVE DEVICE THREADS LOST, SUBSEQUENT ENCOUNTER: ICD-10-CM

## 2020-11-20 DIAGNOSIS — Z01.419 ENCOUNTER FOR GYNECOLOGICAL EXAMINATION (GENERAL) (ROUTINE) WITHOUT ABNORMAL FINDINGS: Primary | ICD-10-CM

## 2020-11-20 PROCEDURE — 77067 SCR MAMMO BI INCL CAD: CPT | Performed by: OBSTETRICS & GYNECOLOGY

## 2020-11-20 PROCEDURE — 99396 PREV VISIT EST AGE 40-64: CPT | Performed by: OBSTETRICS & GYNECOLOGY

## 2020-11-20 PROCEDURE — 76830 TRANSVAGINAL US NON-OB: CPT | Performed by: OBSTETRICS & GYNECOLOGY

## 2020-11-20 NOTE — PROGRESS NOTES
[facility]  [email or web address]    Medhat Roldan MD, [address] [facility]     OB/GYN Follow-up visit, IUD check with ultrasound    Chief Complaint: Follow up visit  Chief Complaint   Patient presents with    Annual Exam       History of Present Illness: This is a follow up visit from an IUD insertion. The IUD was inserted on December 5, 2017. She does not not complain about abnormal bleeding. She does not not complain about pain/cramping. She does not have other concerns. [facility]  Past Medical History:   Diagnosis Date    Encounter for insertion of mirena IUD 12/5/17    Hx of abnormal cervical Pap smear     Pap smear for cervical cancer screening 10/31/2016; 11/18/2019    negative, HPV negative; negative, HPV negative     History reviewed. No pertinent surgical history. Family History   Problem Relation Age of Onset    High Cholesterol Father     Heart Attack Father     Stroke Maternal Grandfather     Heart Attack Paternal Grandfather     Stroke Paternal Grandfather     Pacemaker Paternal Grandfather     High Cholesterol Paternal Grandfather     Other Nephew         Autism    Mental Retardation Maternal Uncle     High Cholesterol Cousin         Paternal    High Cholesterol Paternal Uncle     Pacemaker Paternal Uncle     Other Paternal Uncle         CABG    Heart Attack Paternal Uncle      Social History     Tobacco Use    Smoking status: Never Smoker    Smokeless tobacco: Never Used   Substance Use Topics    Alcohol use: Not on file    Drug use: Not on file     No Known Allergies  Current Outpatient Medications   Medication Sig    levonorgestrel (MIRENA) 20 mcg/24 hr (5 years) IUD 1 Each by IntraUTERine route once.  multivitamin with iron (FLINTSTONES) chewable tablet Take 1 Tab by mouth daily. No current facility-administered medications for this visit.         Review of Systems:  History obtained from the patient  Constitutional: negative for fevers, chills and weight loss  ENT ROS: negative for - hearing change, oral lesions or visual changes  Respiratory: negative for cough, wheezing or dyspnea on exertion  Cardiovascular: negative for chest pain, irregular heart beats, exertional chest pressure/discomfort  Gastrointestinal: negative for dysphagia, nausea and vomiting  Genito-Urinary ROS: no dysuria, trouble voiding, or hematuria  Inteument/breast: negative for rash, breast lump and nipple discharge  Musculoskeletal:negative for stiff joints, neck pain and muscle weakness  Endocrine ROS: negative for - breast changes, galactorrhea or temperature intolerance  Hematological and Lymphatic ROS: negative for - blood clots, bruising or swollen lymph nodes    Physical Exam:  Visit Vitals  /71 (BP 1 Location: Right arm, BP Patient Position: Sitting)   Pulse 65   Ht 5' 9\" (1.753 m)   Wt 181 lb (82.1 kg)   BMI 26.73 kg/m²       GENERAL: alert, well appearing, and in no distress  ABDOMEN: soft, nontender, nondistended, no masses or organomegaly   EGBUS: no lesions, no inflammation, no masses  VULVA: normal appearing vulva with no masses, tenderness or lesions  VAGINA: normal appearing vagina with normal color, no lesions, no discharge  CERVIX: normal appearing cervix without discharge or lesions, non tender  · IUD strings seen and appropriate length  UTERUS: uterus is normal size, shape, consistency and nontender   ADNEXA: normal adnexa in size, nontender and no masses  NEURO: alert, oriented, normal speech    Assessment:  Encounter Diagnoses   Name Primary?  Encounter for gynecological examination (general) (routine) without abnormal findings Yes    Intrauterine contraceptive device threads lost, subsequent encounter        Plan:  The patient is advised that she should contact the office with any questions or concerns. She should make her routine annual gynecologic appointment if needed. Disc typical sx/bleeding patterns with IUD.   Disc how to check IUD strings and screening with AE. Notify MD if any concerns. No orders of the defined types were placed in this encounter. No results found for this visit on 20. Aye Quintana MD    Physician review of ultrasound performed by technician      UTERUS IS ANTEVERTED, NORMAL IN SIZE AND ECHOGENICITY. ENDOMETRIUM MEASURES 5-6MM IN THICKNESS. NO EVIDENCE OF MASS OR ABNORMALITY SEEN  WITHIN THE ENDOMETRIAL CAVITY. IUD IS SEEN IN THE PROPER POSITION WITHIN THE ENDOMETRIAL CAVITY IN THE UTERINE FUNDUS. RIGHT OVARY APPEARS WITHIN NORMAL LIMITS. LEFT ADNEXA APPEARS WITHIN NORMAL LIMITS. NO FREE FLUID SEEN IN THE CDS. Today's ultrasound report and images were reviewed and discussed with the patient. Please see images and imaging report entered by technician in PACS for more detail and progress note and diagnosis entered by MD.    Kristina Stone MD         [facility]  [email or web address]  [phone]    Aey Quintana MD, [facility]       Annual Gynecologic Exam:  WWE <40  Chief Complaint   Patient presents with    Annual Exam         Sadiq Aldana is a 38 y.o.  WHITE [place]  female who presents for an annual well woman exam.  No LMP recorded. (Menstrual status: IUD). .    With regard to the Gardisil vaccine, she has not received it yet. She does not report additional concerns today. Menstrual status:  Her periods are irregular cycles. She does not report dysmenorrhea/painful menses. She does not report irregular bleeding. Sexual history and Contraception:  Social History     Substance and Sexual Activity   Sexual Activity Yes    Partners: Male    Birth control/protection: I.U.D. She never use condoms with sexual activity  She does not reports new sexual partner(s) in the last year. The patient does not request STD testing.     We recommended testing per CDC guidelines and at patient request.     Preventive Medicine History:  Her most recent Pap smear result: normal was obtained in 2019  Her most recent HR HPV screen was Negative obtained in   She does not have a history of MICHAEL 2, 3 or cervical cancer. Past Medical History:   Diagnosis Date    Encounter for insertion of mirena IUD 17    Hx of abnormal cervical Pap smear     Pap smear for cervical cancer screening 10/31/2016; 2019    negative, HPV negative; negative, HPV negative     OB History    Para Term  AB Living   3 3 3 0 0 3   SAB TAB Ectopic Molar Multiple Live Births   0 0 0   0 3      # Outcome Date GA Lbr Rc/2nd Weight Sex Delivery Anes PTL Lv   3 Term 13 39w0d  8 lb 8 oz (3.856 kg) M Vag-Spont EPI N SYED   2 Term 09 40w0d  7 lb 15 oz (3.6 kg) F Vag-Spont EPI N SYED   1 Term 07 41w0d  8 lb 1 oz (3.657 kg) F Vag-Spont EPI N SYED     History reviewed. No pertinent surgical history.   Family History   Problem Relation Age of Onset    High Cholesterol Father     Heart Attack Father     Stroke Maternal Grandfather     Heart Attack Paternal Grandfather     Stroke Paternal Grandfather     Pacemaker Paternal Grandfather     High Cholesterol Paternal Grandfather     Other Nephew         Autism    Mental Retardation Maternal Uncle     High Cholesterol Cousin         Paternal    High Cholesterol Paternal Uncle     Pacemaker Paternal Uncle     Other Paternal Uncle         CABG    Heart Attack Paternal Uncle      Social History     Socioeconomic History    Marital status:      Spouse name: Not on file    Number of children: Not on file    Years of education: Not on file    Highest education level: Not on file   Occupational History    Not on file   Social Needs    Financial resource strain: Not on file    Food insecurity     Worry: Not on file     Inability: Not on file    Transportation needs     Medical: Not on file     Non-medical: Not on file   Tobacco Use    Smoking status: Never Smoker    Smokeless tobacco: Never Used   Substance and Sexual Activity    Alcohol use: Not on file    Drug use: Not on file    Sexual activity: Yes     Partners: Male     Birth control/protection: I.U.D. Lifestyle    Physical activity     Days per week: Not on file     Minutes per session: Not on file    Stress: Not on file   Relationships    Social connections     Talks on phone: Not on file     Gets together: Not on file     Attends Rastafari service: Not on file     Active member of club or organization: Not on file     Attends meetings of clubs or organizations: Not on file     Relationship status: Not on file    Intimate partner violence     Fear of current or ex partner: Not on file     Emotionally abused: Not on file     Physically abused: Not on file     Forced sexual activity: Not on file   Other Topics Concern    Not on file   Social History Narrative    Not on file       No Known Allergies    Current Outpatient Medications   Medication Sig    levonorgestrel (MIRENA) 20 mcg/24 hr (5 years) IUD 1 Each by IntraUTERine route once.  multivitamin with iron (FLINTSTONES) chewable tablet Take 1 Tab by mouth daily. No current facility-administered medications for this visit. There is no problem list on file for this patient.         Review of Systems - History obtained from the patient and patient filled out questionnaire   Constitutional/general, HEENT, CV, Resp, GI, MSK, Neuro, Psych, Heme/lymph, Skin, Breast ROS: no significant complaints except as noted on HPI    Physical Exam  Visit Vitals  /71 (BP 1 Location: Right arm, BP Patient Position: Sitting)   Pulse 65   Ht 5' 9\" (1.753 m)   Wt 181 lb (82.1 kg)   BMI 26.73 kg/m²       Constitutional  · Appearance: well-nourished, well developed, alert, in no acute distress    HENT  · Head and Face: appears normal    Neck  · Inspection/Palpation: normal appearance, no masses or tenderness  · Lymph Nodes: no lymphadenopathy present  · Thyroid: gland size normal, nontender, no nodules or masses present on palpation    Chest  · Respiratory Effort: breathing unlabored  · Auscultation: normal breath sounds    Cardiovascular  · Heart:  · Auscultation: regular rate and rhythm without murmur    Breasts  · Inspection of Breasts: breasts symmetrical, no skin changes, no discharge present, nipple appearance normal, no skin retraction present  · Palpation of Breasts and Axillae: no masses present on palpation, no breast tenderness  · Axillary Lymph Nodes: no lymphadenopathy present    Gastrointestinal  · Abdominal Examination: abdomen non-tender to palpation, normal bowel sounds, no masses present  · Liver and spleen: no hepatomegaly present, spleen not palpable  · Hernias: no hernias identified    Genitourinary  · External Genitalia: normal appearance for age, no discharge present, no tenderness present, no inflammatory lesions present, no masses present  · Vagina: normal vaginal vault without central or paravaginal defects, no discharge present, no inflammatory lesions present, no masses present  · Bladder: non-tender to palpation  · Urethra: appears normal  · Cervix: normal   · iud strings not seen  · Uterus: normal size, shape and consistency  · Adnexa: no adnexal tenderness present, no adnexal masses present  · Perineum: perineum within normal limits, no evidence of trauma, no rashes or skin lesions present  · Anus: anus within normal limits, no hemorrhoids present  · Inguinal Lymph Nodes: no lymphadenopathy present    Skin  · General Inspection: no rash, no lesions identified    Neurologic/Psychiatric  · Mental Status:  · Orientation: grossly oriented to person, place and time  · Mood and Affect: mood normal, affect appropriate    Assessment:  38 y.o.  for well woman exam  Encounter Diagnoses   Name Primary?     Encounter for gynecological examination (general) (routine) without abnormal findings Yes    Intrauterine contraceptive device threads lost, subsequent encounter        Plan:  The patient was counseled about diet, exercise, healthy lifestyle  We discussed current pap smear and HR HPV testing guidelines. We recommend follow up one year for routine annual gynecologic exam or sooner prn  Handouts were given to the patient  We recommend follow up with a primary care physician for chronic medical problems and evaluation of non-gynecologic concerns and to please contact our office with any GYN questions or concerns. Disc IUD x 6 yrs if desired  Add us to wwe 1 yr    Folllow up:  [x] return for annual well woman exam in one year or sooner if she is having problems  [] follow up and ultrasound  [] 6 months  [] 3 months  [] 6 weeks   [] 1 month    No orders of the defined types were placed in this encounter. Results for orders placed or performed during the hospital encounter of 11/20/20   [facility] MAMMO BI SCREENING INCL CAD    Narrative    STUDY:  Bilateral Digital Screening Mammogram    INDICATION:  Screening mammogram.    There is no prior mammogram for direct comparison. BREAST COMPOSITION: The breast tissue is heterogeneously dense, which could  obscure detection of small masses (approximately 51-75% glandular). FINDINGS: Bilateral digital screening mammography was performed, and is  interpreted in conjunction with a computer assisted detection (CAD) system. No  suspicious masses or calcifications are identified. There is no skin thickening  or nipple retraction. Impression    IMPRESSION:    BI-RADS 2. Benign. No mammographic evidence of malignancy. Next screening mammogram is recommended in one year. The patient will be  notified of these results. In accordance with [place] legislation enacted July 2012 (32.1-229 of the Code  of [place]), we have informed this patient by letter that she may have dense  breast tissue. Dense breast tissue can hide cancer or other abnormalities.  We  have instructed her to contact her referring physician if she has any questions  or concerns about this report. There are many factors that can increase a  woman's risk of developing breast cancer, including a family history of breast  cancer. A woman's lifetime risk of developing breast cancer can be estimated  using the Breast Cancer Risk Assessment Tool, found on the [employer] website (www.cancer.gov/bcrisktool/). The addition of breast MRI as a  screening tool may be useful for women with lifetime risk assessments greater  than 20%. Results for orders placed or performed in visit on 11/20/20   US TRANSVAGINAL    Narrative    See impression. Impression    Impression: The final result for this exam can be located in this patient's chart with  results from [facility].

## 2022-04-24 NOTE — PATIENT INSTRUCTIONS
Kayla Hanna regarding pt and pts daughter not wanting the warfarin incase pt were to need esophageal dilation. This RN educated pt and family on the importance of taking warfarin. Asked Dr. Elisabeth Hanna if we should hold or give warfarin depending on pts INR.     2022- Messaged Dr. Elisabeth Hanna regarding EKG changes from 4/23/22 1649 and 4/23/22 2009. Messages fwd to Samara Monroy NP    2102- Received call from Samara Monroy NP. No new orders or interventions. 2105- This RN re-explained the risks of not taking warfarin to pt and pts daughter. Learning About Birth Control: Intrauterine Device (IUD)  What is an intrauterine device (IUD)? The intrauterine device (IUD) is used to prevent pregnancy. It's a small, plastic, T-shaped device. Your doctor places the IUD in your uterus. You have a choice between a hormonal IUD and a copper IUD. The hormonal IUD prevents pregnancy by damaging or killing sperm. It also releases a type of the hormone progestin. Progestin prevents pregnancy in these ways: It thickens the mucus in the cervix. This makes it hard for sperm to travel into the uterus. It also thins the lining of the uterus, which makes it harder for a fertilized egg to attach to the uterus. Progestin can sometimes stop the ovaries from releasing an egg each month (ovulation). Hormonal IUDs prevent pregnancy for 3 to 5 years, depending on which IUD is used. Once you have it, you don't have to do anything else to prevent pregnancy. The copper IUD is wrapped in copper wire. Copper IUDs prevent pregnancy by making the uterus and fallopian tubes produce a fluid that kills sperm. The copper IUD prevents pregnancy for 10 years. Once you have it, you don't have to do anything else to prevent pregnancy. A string tied to the end of the IUD hangs down through the opening of the uterus (called the cervix) into the vagina. You can check that the IUD is in place by feeling for the string. The IUD usually stays in the uterus until your doctor removes it. How well does it work? In the first year of use:  · When the hormonal IUD is used exactly as directed, fewer than 1 woman out of 100 has an unplanned pregnancy. · When the copper IUD is used exactly as directed, fewer than 1 woman out of 100 has an unplanned pregnancy. Be sure to tell your doctor about any health problems you have or medicines you take. He or she can help you choose the birth control method that is right for you. What are the advantages of an IUD?   · An IUD is one of the most effective methods of birth control. · It prevents pregnancy for 3 to 10 years, depending on the type. You don't have to worry about birth control during this time. · It's safe to use while breastfeeding. · IUDs don't contain estrogen. So you can use an IUD if you don't want to take estrogen or can't take estrogen because you have certain health problems or concerns. · An IUD is convenient. It is always providing birth control. You don't need to remember to take a pill or get a shot. You don't have to interrupt sex to protect against pregnancy. · A hormonal IUD may reduce heavy bleeding and cramping. What are the disadvantages of an IUD? · An IUD doesn't protect against sexually transmitted infections (STIs), such as herpes or HIV/AIDS. If you aren't sure if your sex partner might have an STI, use a condom to protect against disease. · A copper IUD may cause periods with more bleeding and cramping. · You have to see a doctor to have an IUD inserted and removed. · You have to check to see if the string is in place. Where can you learn more? Go to http://stella-sara.info/. Enter B543 in the search box to learn more about \"Learning About Birth Control: Intrauterine Device (IUD). \"  Current as of: May 30, 2016  Content Version: 11.1  © 0318-6433 [employer]. Care instructions adapted under license by [employer] (which disclaims liability or warranty for this information). If you have questions about a medical condition or this instruction, always ask your healthcare professional. [place] [address] any warranty or liability for your use of this information.

## 2023-01-08 ENCOUNTER — PATIENT MESSAGE (OUTPATIENT)
Dept: OBGYN CLINIC | Age: 43
End: 2023-01-08

## 2023-01-11 ENCOUNTER — OFFICE VISIT (OUTPATIENT)
Dept: OBGYN CLINIC | Age: 43
End: 2023-01-11

## 2023-01-11 VITALS
DIASTOLIC BLOOD PRESSURE: 72 MMHG | HEIGHT: 69 IN | SYSTOLIC BLOOD PRESSURE: 107 MMHG | WEIGHT: 188 LBS | BODY MASS INDEX: 27.85 KG/M2 | HEART RATE: 69 BPM

## 2023-01-11 DIAGNOSIS — T83.32XD INTRAUTERINE CONTRACEPTIVE DEVICE THREADS LOST, SUBSEQUENT ENCOUNTER: ICD-10-CM

## 2023-01-11 DIAGNOSIS — Z01.419 ENCOUNTER FOR GYNECOLOGICAL EXAMINATION (GENERAL) (ROUTINE) WITHOUT ABNORMAL FINDINGS: Primary | ICD-10-CM

## 2023-01-11 PROCEDURE — 99396 PREV VISIT EST AGE 40-64: CPT | Performed by: OBSTETRICS & GYNECOLOGY

## 2023-01-11 NOTE — PROGRESS NOTES
[address] [facility]  [email or web address]  [phone]    Courtney Euceda MD, [address] [facility]     Annual Gynecologic Exam:  Chief Complaint   Patient presents with    Well Woman    Ultrasound    Mammogram    Checkup IUD       Daisha Mobley is a ,  36 y.o. female   Patient's last menstrual period was 2022. She presents for her annual checkup. She is having not having problems, iud strings not seen in past, had us      Per Rooming Note:  Patient's last menstrual period was 2022. Her periods are normal in flow and minimal to none with hormone containing IUS. She does not have dysmenorrhea. Problems: no significant problems. IUD check today. Birth Control: IUD- mirena   Last Pap: normal obtained   She does not have a history of MICHAEL 2, 3 or cervical cancer. Last Mammogram: had a recent mammogram 2020 which was negative for malignancy. It was normal   Last Bone Density: never obtained  Last colonoscopy: never obtained       Sexual history and Contraception:  Social History     Substance and Sexual Activity   Sexual Activity Yes    Partners: Male    Birth control/protection: I.U.D. Past Medical History:   Diagnosis Date    Dense breast tissue on mammogram 2020    BI-RADS 2. Benign. Encounter for insertion of mirena IUD 17    Hx of abnormal cervical Pap smear     Pap smear for cervical cancer screening 10/31/2016; 2019    negative, HPV negative; negative, HPV negative     Current Outpatient Medications   Medication Sig    levonorgestreL (MIRENA) 20 mcg/24 hours (8 yrs) 52 mg IUD 1 Each by IntraUTERine route once. multivitamin with iron (FLINTSTONES) chewable tablet Take 1 Tab by mouth daily. (Patient not taking: Reported on 2023)     No current facility-administered medications for this visit.      OB History    Para Term  AB Living   3 3 3 0 0 3   SAB IAB Ectopic Molar Multiple Live Births   0 0 0   0 3      # Outcome Date GA Lbr Rc/2nd Weight Sex Delivery Anes PTL Lv   3 Term 12/12/13 39w0d  8 lb 8 oz (3.856 kg) M Vag-Spont EPI N SYED   2 Term 12/02/09 40w0d  7 lb 15 oz (3.6 kg) F Vag-Spont EPI N SYED   1 Term 07/21/07 41w0d  8 lb 1 oz (3.657 kg) F Vag-Spont EPI N SYED     Past Surgical History:   Procedure Laterality Date    HX HIP REPLACEMENT Right 2021     Family History   Problem Relation Age of Onset    High Cholesterol Father     Heart Attack Father     Stroke Maternal Grandfather     Heart Attack Paternal Grandfather     Stroke Paternal Grandfather     Pacemaker Paternal Grandfather     High Cholesterol Paternal Grandfather     Other Nephew         Autism    Mental Retardation Maternal Uncle     High Cholesterol Cousin         Paternal    High Cholesterol Paternal Uncle     Pacemaker Paternal Uncle     Other Paternal Uncle         CABG    Heart Attack Paternal Uncle      Social History     Socioeconomic History    Marital status:      Spouse name: Not on file    Number of children: Not on file    Years of education: Not on file    Highest education level: Not on file   Occupational History    Not on file   Tobacco Use    Smoking status: Never    Smokeless tobacco: Never   Substance and Sexual Activity    Alcohol use: Not on file    Drug use: Not on file    Sexual activity: Yes     Partners: Male     Birth control/protection: I.U.D. Other Topics Concern    Not on file   Social History Narrative    Not on file     Social Determinants of Health     Financial Resource Strain: Not on file   Food Insecurity: Not on file   Transportation Needs: Not on file   Physical Activity: Not on file   Stress: Not on file   Social Connections: Not on file   Intimate Partner Violence: Not on file   Housing Stability: Not on file     Tobacco History:  reports that she has never smoked. She has never used smokeless tobacco.  Alcohol Abuse:  has no history on file for alcohol use. Drug Abuse:  has no history on file for drug use.   No Known Allergies    There is no problem list on file for this patient. Review of Systems - History obtained from the patient and patient filled out questionnaire   Constitutional/general, HEENT, CV, Resp, GI, MSK, Neuro, Psych, Heme/lymph, Skin, Breast ROS: no significant complaints except as noted on HPI    Physical Exam  Visit Vitals  /72   Pulse 69   Ht 5' 9\" (1.753 m)   Wt 188 lb (85.3 kg)   LMP 12/22/2022   BMI 27.76 kg/m²       Constitutional  Appearance: well-nourished, well developed, alert, in no acute distress    HENT  Head and Face: appears normal    Neck  Inspection/Palpation: normal appearance, no masses or tenderness  Lymph Nodes: no lymphadenopathy present  Thyroid: gland size normal, nontender, no nodules or masses present on palpation    Chest  Respiratory Effort: breathing unlabored  Auscultation: normal breath sounds    Cardiovascular  Heart:   Auscultation: regular rate and rhythm without murmur    Breasts  Inspection of Breasts: breasts symmetrical, no skin changes, no discharge present, nipple appearance normal, no skin retraction present  Palpation of Breasts and Axillae: no masses present on palpation, no breast tenderness  Axillary Lymph Nodes: no lymphadenopathy present    Gastrointestinal  Abdominal Examination: abdomen non-tender to palpation, normal bowel sounds, no masses present  Liver and spleen: no hepatomegaly present, spleen not palpable  Hernias: no hernias identified    Genitourinary  External Genitalia: normal appearance for age, no discharge present, no tenderness present, no inflammatory lesions present, no masses present  Vagina: normal vaginal vault without central or paravaginal defects, minimal discharge present, no inflammatory lesions present, no masses present  Bladder: non-tender to palpation  Urethra: appears normal  Cervix: normal   IUD strings not seen  Uterus: normal size, shape and consistency  Adnexa: no adnexal tenderness present, no adnexal masses present  Perineum: perineum within normal limits, no evidence of trauma, no rashes or skin lesions present  Anus: anus within normal limits, no hemorrhoids present  Inguinal Lymph Nodes: no lymphadenopathy present    Skin  General Inspection: no rash, no lesions identified    Neurologic/Psychiatric  Mental Status:  Orientation: grossly oriented to person, place and time  Mood and Affect: mood normal, affect appropriate    Assessment:  36 y.o.  for well woman exam  Her current medical status is satisfactory with no evidence of significant gynecologic issues. Encounter Diagnoses   Name Primary? Encounter for gynecological examination (general) (routine) without abnormal findings Yes    Intrauterine contraceptive device threads lost, subsequent encounter        Plan:  The patient was counseled about diet, exercise, healthy lifestyle  I recommend annual well woman exams  We discussed current pap smear and HR HPV testing guidelines. I recommended follow up one year for routine annual gynecologic exam or sooner prn  Handouts were given to the patient  I recommended follow up with a primary care physician for chronic medical problems and evaluation of non-gynecologic concerns and to please contact our office with any GYN questions or concerns. I recommended testing per CDC guidelines and at patient request.   CRISTY and US w wwe, lost iud strings        Folllow up:  [x] return for annual well woman exam in one year or sooner if she is having problems  [] follow up and ultrasound  [] 6 months  [] 3 months  [] 6 weeks   [] 1 month    Orders Placed This Encounter    PAP IG, APTIMA HPV AND RFX 16/18,45 ([id number])       No results found for any visits on 23.

## 2023-01-11 NOTE — PROGRESS NOTES
Carolyn Jose is a 36 y.o. female returns for an annual exam     Chief Complaint   Patient presents with    Well Woman    Ultrasound    Mammogram    Checkup IUD       Patient's last menstrual period was 12/22/2022. Her periods are normal in flow and minimal to none with hormone containing IUS. She does not have dysmenorrhea. Problems: no significant problems. IUD check today. Birth Control: IUD- mirena 2017  Last Pap: normal obtained 2019  She does not have a history of MICHAEL 2, 3 or cervical cancer. Last Mammogram: had a recent mammogram 11/2020 which was negative for malignancy. It was normal   Last Bone Density: never obtained  Last colonoscopy: never obtained      1. Have you been to the ER, urgent care clinic, or hospitalized since your last visit? Hip replacement 4/2022     2. Have you seen or consulted any other health care providers outside of the [address] [place] [address] since your last visit? Yes [facility] [place] [facility] [place]    Examination chaperoned by Gregory Sears MA.    TRANSVAGINAL ULTRASOUND PERFORMED  UTERUS IS ANTEVERTED, NORMAL IN SIZE AND HETEROGENEOUS IN ECHOGENICITY. ENDOMETRIUM MEASURES 5-6MM IN THICKNESS. NO EVIDENCE OF MASSES OR ABNORMALITIES ARE SEEN. IUD IS SEEN IN THE PROPER POSITION WITHIN THE ENDOMETRIAL CAVITY IN THE UTERINE FUNDUS. RIGHT OVARY APPEARS WITHIN NORMAL LIMITS. A FOLLICULAR CYST IS SEEN. LEFT OVARY APPEARS WITHIN NORMAL LIMITS. NO FREE FLUID SEEN IN THE CDS.

## 2023-01-14 LAB
CYTOLOGIST CVX/VAG CYTO: NORMAL
CYTOLOGY CVX/VAG DOC CYTO: NORMAL
CYTOLOGY CVX/VAG DOC THIN PREP: NORMAL
CYTOLOGY HISTORY:: NORMAL
DX ICD CODE: NORMAL
HPV GENOTYPE REFLEX: NORMAL
HPV I/H RISK 4 DNA CVX QL PROBE+SIG AMP: NEGATIVE
Lab: NORMAL
OTHER STN SPEC: NORMAL
STAT OF ADQ CVX/VAG CYTO-IMP: NORMAL

## 2023-01-16 ENCOUNTER — TELEPHONE (OUTPATIENT)
Dept: OBGYN CLINIC | Age: 43
End: 2023-01-16

## 2023-01-16 NOTE — TELEPHONE ENCOUNTER
----- Message from Nehemias Vaughn MD sent at 1/12/2023  8:04 PM EST -----  MMG low risk/dense. Please update PMH/HM include date of imaging (mm/dd/yy) add \"dense\" or \"very dense\" to comments, see report  If pt desires, see [address] message, order bilateral screening breast US Order: [id number] for US Whole Breat Screening BI complete. Note: imaging only done at [facility] and could cost out of pocket ~$500. Patient may decline, or schedule a consult to discuss breast cancer risks in more detail.

## 2023-01-16 NOTE — TELEPHONE ENCOUNTER
Follow up call to Ms. Stacie Wright - Pt verified self and birth date for privacy precautions. Patient was advised of results of pap & mammo & advised regarding breast ultrasound. Pt declines breast ultrasound at this time but will opt for 3d mammo's in the future. Ms. Stacie Wright acknowledged understanding and all questions were answered to patients satisfaction. No further questions or concerns at this time.

## 2023-01-16 NOTE — PROGRESS NOTES
Normal pap smear, message sent if [facility] active. Update PMH/: include: Date of pap, Cytology: wnl. For HR HPV results: list NEG or POS, when done.

## 2023-10-17 ENCOUNTER — TELEPHONE (OUTPATIENT)
Age: 43
End: 2023-10-17

## 2023-10-17 NOTE — TELEPHONE ENCOUNTER
Two patient identifiers used    39year old patient last seen in the office on 1/11/2023 for ae    Patient calling to say for the past several days she has been having cloudy urine, pain with urination and frequency     Patient is calling to ask for antibiotic to treat her symptoms    Patient was advised of need to be seen in the office     Patient does not have PcP    Patient was placed on the schedule for 10/19/2023 at 3:30PM( ok per MW)    Patient may try to go to urgent care, does not have PCP    Patient will cancel appointment if she goes to urgent care    Patient verbalized understanding.

## 2023-10-19 ENCOUNTER — OFFICE VISIT (OUTPATIENT)
Age: 43
End: 2023-10-19

## 2023-10-19 VITALS — WEIGHT: 188 LBS | DIASTOLIC BLOOD PRESSURE: 67 MMHG | BODY MASS INDEX: 27.76 KG/M2 | SYSTOLIC BLOOD PRESSURE: 110 MMHG

## 2023-10-19 DIAGNOSIS — R30.9 PAINFUL URINATION: ICD-10-CM

## 2023-10-19 DIAGNOSIS — R35.0 URINARY FREQUENCY: Primary | ICD-10-CM

## 2023-10-19 LAB
BILIRUBIN, URINE, POC: NEGATIVE
BLOOD URINE, POC: NORMAL
GLUCOSE URINE, POC: NEGATIVE
KETONES, URINE, POC: NEGATIVE
LEUKOCYTE ESTERASE, URINE, POC: NORMAL
NITRITE, URINE, POC: NEGATIVE
PH, URINE, POC: 6.5 (ref 4.6–8)
PROTEIN,URINE, POC: NEGATIVE
SPECIFIC GRAVITY, URINE, POC: 1.01 (ref 1–1.03)
URINALYSIS CLARITY, POC: CLEAR
URINALYSIS COLOR, POC: YELLOW
UROBILINOGEN, POC: NORMAL

## 2023-10-19 RX ORDER — SULFAMETHOXAZOLE AND TRIMETHOPRIM 800; 160 MG/1; MG/1
1 TABLET ORAL 2 TIMES DAILY
Qty: 10 TABLET | Refills: 0 | Status: SHIPPED | OUTPATIENT
Start: 2023-10-19 | End: 2023-10-24

## 2023-10-19 NOTE — PROGRESS NOTES
[address] [facility]  [email or web address]  [email or web address]  [phone]    Dariana Jimenez MD, [address] [facility]      OB/GYN Problem visit    HPI  Lizeth Lyles is a No obstetric history on file. , 39 y.o. female who presents for a problem visit. Chief Complaint   Patient presents with    Urinary Burning    Urinary Frequency       Pt co urinary discomfort and frequency, OTC UTI meds helped sx. She reports no new sexual partners. Per Rooming Note:  Lizeth Lyles is a 39 y.o. female presents for a problem visit. No LMP recorded. Birth Control: IUD. Last Pap: 1/11/2023  Last or next WWE is: 1/2024     The patient is reporting having: urinary burning and frequency for a week. Denies vaginal discharge or irritation  This is a new problem. She has experienced this problem before. She reports the symptoms are is unchanged. Aggravating factors include none. And alleviating factors include none. She does not have other concerns. Sexual history and Contraception:  Social History     Substance and Sexual Activity   Sexual Activity Not on file       Past Medical History:   Diagnosis Date    Dense breast tissue on mammogram 11/20/2020    BI-RADS 2. Benign.     Dense breast tissue on mammogram 01/11/2023    neg/dense - declines breast us will opt for 3d mammo instead    Encounter for insertion of mirena IUD 12/05/2017    Hx of abnormal cervical Pap smear     Pap smear for cervical cancer screening 01/11/2023    neg/hpv neg    Pap smear for cervical cancer screening 10/31/2016; 11/18/2019    negative, HPV negative; negative, HPV negative         Current Outpatient Medications:     sulfamethoxazole-trimethoprim (BACTRIM DS;SEPTRA DS) 800-160 MG per tablet, Take 1 tablet by mouth 2 times daily for 5 days, Disp: 10 tablet, Rfl: 0    levonorgestrel (MIRENA) IUD 52 mg, 1 each by IntraUTERine route once, Disp: , Rfl:     Past Surgical

## 2023-10-19 NOTE — PROGRESS NOTES
Rooming note, gyn problem visit:    Chief Complaint   Patient presents with    Urinary Burning    Urinary Frequency     Feliberto Garcia is a 39 y.o. female presents for a problem visit. No LMP recorded. Birth Control: IUD. Last Pap: 1/11/2023  Last or next WWE is: 1/2024    The patient is reporting having: urinary burning and frequency for a week. Denies vaginal discharge or irritation  This is a new problem. She has experienced this problem before. She reports the symptoms are is unchanged. Aggravating factors include none. And alleviating factors include none. She does not have other concerns.

## 2023-10-22 LAB — BACTERIA UR CULT: NORMAL

## 2023-10-25 LAB — BACTERIA UR CULT: ABNORMAL

## 2023-10-26 ENCOUNTER — TELEPHONE (OUTPATIENT)
Age: 43
End: 2023-10-26

## 2023-10-26 NOTE — TELEPHONE ENCOUNTER
----- Message from Hafsa Crocker MD sent at 10/25/2023  9:55 PM EDT -----  Cw UTI  Rx sent  Fu if NI  Txd at visit

## 2024-01-12 ENCOUNTER — OFFICE VISIT (OUTPATIENT)
Age: 44
End: 2024-01-12
Payer: COMMERCIAL

## 2024-01-12 VITALS — BODY MASS INDEX: 27.76 KG/M2 | DIASTOLIC BLOOD PRESSURE: 75 MMHG | WEIGHT: 188 LBS | SYSTOLIC BLOOD PRESSURE: 109 MMHG

## 2024-01-12 DIAGNOSIS — T83.32XD INTRAUTERINE CONTRACEPTIVE DEVICE THREADS LOST, SUBSEQUENT ENCOUNTER: ICD-10-CM

## 2024-01-12 DIAGNOSIS — Z01.419 ENCOUNTER FOR GYNECOLOGICAL EXAMINATION: Primary | ICD-10-CM

## 2024-01-12 PROCEDURE — 99396 PREV VISIT EST AGE 40-64: CPT | Performed by: OBSTETRICS & GYNECOLOGY

## 2024-01-12 NOTE — PROGRESS NOTES
[facility]  [email or web address][email or web address]  [phone]    Marlyn Andrews MD, FACOG        Annual Gynecologic Exam:  Chief Complaint   Patient presents with    Annual Exam       Rena Harrell is a ,  43 y.o. female   No LMP recorded. (Menstrual status: IUD).    She presents for her annual checkup.     She is having no problems.      Per Rooming Note:  No LMP recorded. (Menstrual status: IUD).  Her periods are absent due to IUD  Problems: no problems  Birth Control: IUD.  IUD check today  TRANSVAGINAL ULTRASOUND PERFORMED UTERUS IS ANTEVERTED, NORMAL IN SIZE AND HETEROGENEOUS IN ECHOGENICITY. ENDOMETRIUM MEASURES 7-8MM IN THICKNESS. NO EVIDENCE OF MASSES OR ABNORMALITIES ARE SEEN. IUD IS SEEN IN THE PROPER POSITION WITHIN THE ENDOMETRIAL CAVITY IN THE UTERINE FUNDUS. RIGHT OVARY APPEARS WITHIN NORMAL LIMITS. LEFT ADNEXA APPEARS WITHIN NORMAL LIMITS. NO FREE FLUID SEEN IN THE CDS  Last Pap: see report obtained 1 year(s) ago.  She does not have a history of KIRAN 2, 3 or cervical cancer.   Last Mammogram: had her mammogram today in our office.  It was see report.     Sexual history and Contraception:    Social History     Substance and Sexual Activity   Sexual Activity Not on file      Past Medical History:   Diagnosis Date    Dense breast tissue on mammogram 2020    BI-RADS 2. Benign.    Dense breast tissue on mammogram 2023    neg/dense - declines breast us will opt for 3d mammo instead    Encounter for insertion of mirena IUD 2017    Hx of abnormal cervical Pap smear     Pap smear for cervical cancer screening 2023    neg/hpv neg    Pap smear for cervical cancer screening 10/31/2016; 2019    negative, HPV negative; negative, HPV negative     Current Outpatient Medications   Medication Sig Dispense Refill    levonorgestrel (MIRENA) IUD 52 mg 1 each by IntraUTERine route once       No current facility-administered medications for this visit.      OB History

## 2024-01-12 NOTE — PROGRESS NOTES
Rena Harrell is a 43 y.o. female returns for an annual exam     Chief Complaint   Patient presents with    Annual Exam       No LMP recorded. (Menstrual status: IUD).  Her periods are absent due to IUD  Problems: no problems  Birth Control: IUD.  IUD check today  TRANSVAGINAL ULTRASOUND PERFORMED UTERUS IS ANTEVERTED, NORMAL IN SIZE AND HETEROGENEOUS IN ECHOGENICITY. ENDOMETRIUM MEASURES 7-8MM IN THICKNESS. NO EVIDENCE OF MASSES OR ABNORMALITIES ARE SEEN. IUD IS SEEN IN THE PROPER POSITION WITHIN THE ENDOMETRIAL CAVITY IN THE UTERINE FUNDUS. RIGHT OVARY APPEARS WITHIN NORMAL LIMITS. LEFT ADNEXA APPEARS WITHIN NORMAL LIMITS. NO FREE FLUID SEEN IN THE CDS  Last Pap: see report obtained 1 year(s) ago.  She does not have a history of KIRAN 2, 3 or cervical cancer.   Last Mammogram: had her mammogram today in our office.  It was see report.         1. Have you been to the ER, urgent care clinic, or hospitalized since your last visit? No    2. Have you seen or consulted any other health care providers outside of the [facility] since your last visit? No    Examination chaperoned by Samantha Turpin LPN.

## 2025-02-19 DIAGNOSIS — Z30.431 IUD CHECK UP: Primary | ICD-10-CM

## 2025-02-24 ENCOUNTER — OFFICE VISIT (OUTPATIENT)
Age: 45
End: 2025-02-24
Payer: COMMERCIAL

## 2025-02-24 VITALS
WEIGHT: 182.2 LBS | SYSTOLIC BLOOD PRESSURE: 107 MMHG | DIASTOLIC BLOOD PRESSURE: 72 MMHG | BODY MASS INDEX: 26.91 KG/M2 | HEART RATE: 84 BPM

## 2025-02-24 DIAGNOSIS — T83.32XD INTRAUTERINE CONTRACEPTIVE DEVICE THREADS LOST, SUBSEQUENT ENCOUNTER: Primary | ICD-10-CM

## 2025-02-24 DIAGNOSIS — Z30.431 IUD CHECK UP: ICD-10-CM

## 2025-02-24 DIAGNOSIS — Z01.411 ENCOUNTER FOR GYNECOLOGICAL EXAMINATION (GENERAL) (ROUTINE) WITH ABNORMAL FINDINGS: ICD-10-CM

## 2025-02-24 DIAGNOSIS — N64.4 BREAST PAIN, LEFT: ICD-10-CM

## 2025-02-24 PROCEDURE — 99459 PELVIC EXAMINATION: CPT | Performed by: OBSTETRICS & GYNECOLOGY

## 2025-02-24 PROCEDURE — 99396 PREV VISIT EST AGE 40-64: CPT | Performed by: OBSTETRICS & GYNECOLOGY

## 2025-02-24 NOTE — PROGRESS NOTES
[facility]  [email or web address]  [email or web address]  [phone]    Marlyn Andrews MD, FACOG      OB/GYN Problem visit/fu    HPI  Rena Harrell is a , 45 y.o. female who presents for a problem visit.   Chief Complaint   Patient presents with    Annual Exam    Ultrasound     IUD Check (Lost Strings)       ***    Per Rooming Note:    No LMP recorded. (Menstrual status: IUD).  Her periods are none in flow and minimal to none with hormone containing IUS..  She does not have dysmenorrhea.  Problems: none.  IUD check for lost strings.   Birth Control: Mirena IUD inserted 2017, due out 2025.   Last Pap: normal obtained 2023  She does not have a history of KIRAN 2, 3 or cervical cancer.   Last Mammogram: had a recent mammogram 2025 which was negative for malignancy.  It was Heterogeneously dense .   Last Bone Density: never done.   Last colonoscopy: never done.        Sexual history and Contraception:  Social History     Substance and Sexual Activity   Sexual Activity Yes    Partners: Male    Birth control/protection: I.U.D.    Comment: Mirena IUD inserted 2017, due out 2025       Past Medical History:   Diagnosis Date    Dense breast tissue on mammogram 2020    BI-RADS 2. Benign.    Dense breast tissue on mammogram 2023    neg/dense - declines breast us will opt for 3d mammo instead    Encounter for insertion of Mirena IUD 2017    due out 2025    H/O mammogram 2024    low risk/Heterogeneously dense    H/O mammogram 2025    low risk/Heterogeneously dense    Hx of abnormal cervical Pap smear     Pap smear for cervical cancer screening 2023    neg/hpv neg    Pap smear for cervical cancer screening 10/31/2016; 2019    negative, HPV negative; negative, HPV negative         Current Outpatient Medications:     levonorgestrel (MIRENA) IUD 52 mg, 1 each by IntraUTERine route

## 2025-02-24 NOTE — PROGRESS NOTES
Rena Harrell is a 45 y.o. female returns for an annual exam     Chief Complaint   Patient presents with    Annual Exam    Ultrasound     IUD Check (Lost Strings)       No LMP recorded. (Menstrual status: IUD).  Her periods are none in flow and minimal to none with hormone containing IUS..  She does not have dysmenorrhea.  Problems: none.  IUD check for lost strings.   Birth Control: Mirena IUD inserted 12/05/2017, due out 12/2025.   Last Pap: normal obtained 1/2023  She does not have a history of KIRAN 2, 3 or cervical cancer.   Last Mammogram: had a recent mammogram 01/17/2025 which was negative for malignancy.  It was Heterogeneously dense .   Last Bone Density: never done.   Last colonoscopy: never done.         Examination chaperoned by Nicole Martino MA.

## 2025-02-24 NOTE — PROGRESS NOTES
[facility]  [phone]    Marlyn Andrews MD, FACOG        Annual Gynecologic Exam:  Chief Complaint   Patient presents with    Annual Exam    Ultrasound     IUD Check (Lost Strings)       Rena Harrell is a ,  45 y.o. female   No LMP recorded. (Menstrual status: IUD).    The patient presents for her annual gynecologic checkup.     The patient is having occ left breast/chest pain on left side every other month.         Per Rooming Note:  No LMP recorded. (Menstrual status: IUD).  Her periods are none in flow and minimal to none with hormone containing IUS..  She does not have dysmenorrhea.  Problems: none.  IUD check for lost strings.   Birth Control: Mirena IUD inserted 2017, due out 2025.   Last Pap: normal obtained 2023  She does not have a history of KIRAN 2, 3 or cervical cancer.   Last Mammogram: had a recent mammogram 2025 which was negative for malignancy.  It was Heterogeneously dense .   Last Bone Density: never done.   Last colonoscopy: never done.        Sexual history and Contraception:    Social History     Substance and Sexual Activity   Sexual Activity Yes    Partners: Male    Birth control/protection: I.U.D.    Comment: Mirena IUD inserted 2017, due out 2025      Past Medical History:   Diagnosis Date    Dense breast tissue on mammogram 2020    BI-RADS 2. Benign.    Dense breast tissue on mammogram 2023    neg/dense - declines breast us will opt for 3d mammo instead    Encounter for insertion of Mirena IUD 2017    due out 2025    H/O mammogram 2024    low risk/Heterogeneously dense    H/O mammogram 2025    low risk/Heterogeneously dense    Hx of abnormal cervical Pap smear     Pap smear for cervical cancer screening 2023    neg/hpv neg    Pap smear for cervical cancer screening 10/31/2016; 2019    negative, HPV negative; negative, HPV negative     Current Outpatient Medications   Medication Sig